# Patient Record
Sex: MALE | Race: OTHER | HISPANIC OR LATINO | URBAN - METROPOLITAN AREA
[De-identification: names, ages, dates, MRNs, and addresses within clinical notes are randomized per-mention and may not be internally consistent; named-entity substitution may affect disease eponyms.]

---

## 2017-08-30 ENCOUNTER — OUTPATIENT (OUTPATIENT)
Dept: OUTPATIENT SERVICES | Facility: HOSPITAL | Age: 52
LOS: 1 days | Discharge: HOME | End: 2017-08-30

## 2017-08-30 DIAGNOSIS — K21.9 GASTRO-ESOPHAGEAL REFLUX DISEASE WITHOUT ESOPHAGITIS: ICD-10-CM

## 2017-08-30 DIAGNOSIS — G43.909 MIGRAINE, UNSPECIFIED, NOT INTRACTABLE, WITHOUT STATUS MIGRAINOSUS: ICD-10-CM

## 2017-08-30 DIAGNOSIS — C34.90 MALIGNANT NEOPLASM OF UNSPECIFIED PART OF UNSPECIFIED BRONCHUS OR LUNG: ICD-10-CM

## 2017-08-30 DIAGNOSIS — B34.9 VIRAL INFECTION, UNSPECIFIED: ICD-10-CM

## 2017-08-30 DIAGNOSIS — C34.91 MALIGNANT NEOPLASM OF UNSPECIFIED PART OF RIGHT BRONCHUS OR LUNG: ICD-10-CM

## 2017-08-30 DIAGNOSIS — N18.2 CHRONIC KIDNEY DISEASE, STAGE 2 (MILD): ICD-10-CM

## 2017-08-30 DIAGNOSIS — B18.2 CHRONIC VIRAL HEPATITIS C: ICD-10-CM

## 2017-08-30 DIAGNOSIS — J45.20 MILD INTERMITTENT ASTHMA, UNCOMPLICATED: ICD-10-CM

## 2017-10-21 ENCOUNTER — EMERGENCY (EMERGENCY)
Facility: HOSPITAL | Age: 52
LOS: 0 days | Discharge: HOME | End: 2017-10-21

## 2017-10-21 DIAGNOSIS — Z87.891 PERSONAL HISTORY OF NICOTINE DEPENDENCE: ICD-10-CM

## 2017-10-21 DIAGNOSIS — Z23 ENCOUNTER FOR IMMUNIZATION: ICD-10-CM
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
## 2017-10-21 DIAGNOSIS — J45.909 UNSPECIFIED ASTHMA, UNCOMPLICATED: ICD-10-CM

## 2017-10-21 DIAGNOSIS — Z90.2 ACQUIRED ABSENCE OF LUNG [PART OF]: ICD-10-CM

## 2017-10-21 DIAGNOSIS — S01.511A LACERATION WITHOUT FOREIGN BODY OF LIP, INITIAL ENCOUNTER: ICD-10-CM

## 2017-10-21 DIAGNOSIS — B18.2 CHRONIC VIRAL HEPATITIS C: ICD-10-CM

## 2017-10-21 DIAGNOSIS — Z79.899 OTHER LONG TERM (CURRENT) DRUG THERAPY: ICD-10-CM

## 2017-10-21 DIAGNOSIS — S09.90XA UNSPECIFIED INJURY OF HEAD, INITIAL ENCOUNTER: ICD-10-CM

## 2017-10-21 DIAGNOSIS — S62.314A DISPLACED FRACTURE OF BASE OF FOURTH METACARPAL BONE, RIGHT HAND, INITIAL ENCOUNTER FOR CLOSED FRACTURE: ICD-10-CM

## 2017-10-21 DIAGNOSIS — C34.90 MALIGNANT NEOPLASM OF UNSPECIFIED PART OF UNSPECIFIED BRONCHUS OR LUNG: ICD-10-CM
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
## 2017-10-21 DIAGNOSIS — G43.909 MIGRAINE, UNSPECIFIED, NOT INTRACTABLE, WITHOUT STATUS MIGRAINOSUS: ICD-10-CM

## 2017-10-21 DIAGNOSIS — S00.31XA ABRASION OF NOSE, INITIAL ENCOUNTER: ICD-10-CM

## 2017-10-21 DIAGNOSIS — K21.9 GASTRO-ESOPHAGEAL REFLUX DISEASE WITHOUT ESOPHAGITIS: ICD-10-CM

## 2019-01-23 ENCOUNTER — EMERGENCY (EMERGENCY)
Facility: HOSPITAL | Age: 54
LOS: 0 days | Discharge: HOME | End: 2019-01-24
Attending: EMERGENCY MEDICINE | Admitting: EMERGENCY MEDICINE

## 2019-01-23 VITALS
HEART RATE: 116 BPM | SYSTOLIC BLOOD PRESSURE: 144 MMHG | RESPIRATION RATE: 20 BRPM | OXYGEN SATURATION: 98 % | TEMPERATURE: 100 F | DIASTOLIC BLOOD PRESSURE: 74 MMHG

## 2019-01-23 DIAGNOSIS — R10.84 GENERALIZED ABDOMINAL PAIN: ICD-10-CM

## 2019-01-23 DIAGNOSIS — F17.200 NICOTINE DEPENDENCE, UNSPECIFIED, UNCOMPLICATED: ICD-10-CM

## 2019-01-23 DIAGNOSIS — Z79.899 OTHER LONG TERM (CURRENT) DRUG THERAPY: ICD-10-CM

## 2019-01-23 DIAGNOSIS — R11.2 NAUSEA WITH VOMITING, UNSPECIFIED: ICD-10-CM

## 2019-01-23 DIAGNOSIS — J06.9 ACUTE UPPER RESPIRATORY INFECTION, UNSPECIFIED: ICD-10-CM

## 2019-01-23 DIAGNOSIS — R07.81 PLEURODYNIA: ICD-10-CM

## 2019-01-23 DIAGNOSIS — K21.9 GASTRO-ESOPHAGEAL REFLUX DISEASE WITHOUT ESOPHAGITIS: ICD-10-CM

## 2019-01-23 DIAGNOSIS — R05 COUGH: ICD-10-CM

## 2019-01-23 DIAGNOSIS — J44.1 CHRONIC OBSTRUCTIVE PULMONARY DISEASE WITH (ACUTE) EXACERBATION: ICD-10-CM

## 2019-01-23 DIAGNOSIS — R00.0 TACHYCARDIA, UNSPECIFIED: ICD-10-CM

## 2019-01-23 LAB
ALBUMIN SERPL ELPH-MCNC: 4.3 G/DL — SIGNIFICANT CHANGE UP (ref 3.5–5.2)
ALP SERPL-CCNC: 90 U/L — SIGNIFICANT CHANGE UP (ref 30–115)
ALT FLD-CCNC: 147 U/L — HIGH (ref 0–41)
AMMONIA BLD-MCNC: 40 UMOL/L — SIGNIFICANT CHANGE UP (ref 11–55)
ANION GAP SERPL CALC-SCNC: 17 MMOL/L — HIGH (ref 7–14)
AST SERPL-CCNC: 186 U/L — HIGH (ref 0–41)
BASOPHILS # BLD AUTO: 0.04 K/UL — SIGNIFICANT CHANGE UP (ref 0–0.2)
BASOPHILS NFR BLD AUTO: 0.9 % — SIGNIFICANT CHANGE UP (ref 0–1)
BILIRUB DIRECT SERPL-MCNC: 0.2 MG/DL — SIGNIFICANT CHANGE UP (ref 0–0.2)
BILIRUB INDIRECT FLD-MCNC: 0.4 MG/DL — SIGNIFICANT CHANGE UP (ref 0.2–1.2)
BILIRUB SERPL-MCNC: 0.6 MG/DL — SIGNIFICANT CHANGE UP (ref 0.2–1.2)
BLD GP AB SCN SERPL QL: SIGNIFICANT CHANGE UP
BUN SERPL-MCNC: 15 MG/DL — SIGNIFICANT CHANGE UP (ref 10–20)
CALCIUM SERPL-MCNC: 9.6 MG/DL — SIGNIFICANT CHANGE UP (ref 8.5–10.1)
CHLORIDE SERPL-SCNC: 97 MMOL/L — LOW (ref 98–110)
CO2 SERPL-SCNC: 24 MMOL/L — SIGNIFICANT CHANGE UP (ref 17–32)
CREAT SERPL-MCNC: 1.1 MG/DL — SIGNIFICANT CHANGE UP (ref 0.7–1.5)
EOSINOPHIL # BLD AUTO: 0 K/UL — SIGNIFICANT CHANGE UP (ref 0–0.7)
EOSINOPHIL NFR BLD AUTO: 0 % — SIGNIFICANT CHANGE UP (ref 0–8)
GLUCOSE SERPL-MCNC: 112 MG/DL — HIGH (ref 70–99)
HCT VFR BLD CALC: 43.6 % — SIGNIFICANT CHANGE UP (ref 42–52)
HGB BLD-MCNC: 15.4 G/DL — SIGNIFICANT CHANGE UP (ref 14–18)
LIDOCAIN IGE QN: 31 U/L — SIGNIFICANT CHANGE UP (ref 7–60)
LYMPHOCYTES # BLD AUTO: 0.16 K/UL — LOW (ref 1.2–3.4)
LYMPHOCYTES # BLD AUTO: 3.5 % — LOW (ref 20.5–51.1)
MCHC RBC-ENTMCNC: 32.7 PG — HIGH (ref 27–31)
MCHC RBC-ENTMCNC: 35.3 G/DL — SIGNIFICANT CHANGE UP (ref 32–37)
MCV RBC AUTO: 92.6 FL — SIGNIFICANT CHANGE UP (ref 80–94)
MONOCYTES # BLD AUTO: 0.36 K/UL — SIGNIFICANT CHANGE UP (ref 0.1–0.6)
MONOCYTES NFR BLD AUTO: 7.8 % — SIGNIFICANT CHANGE UP (ref 1.7–9.3)
NEUTROPHILS # BLD AUTO: 3.98 K/UL — SIGNIFICANT CHANGE UP (ref 1.4–6.5)
NEUTROPHILS NFR BLD AUTO: 81.7 % — HIGH (ref 42.2–75.2)
NRBC # BLD: 0 /100 WBCS — SIGNIFICANT CHANGE UP (ref 0–0)
PLATELET # BLD AUTO: 70 K/UL — LOW (ref 130–400)
POTASSIUM SERPL-MCNC: 4 MMOL/L — SIGNIFICANT CHANGE UP (ref 3.5–5)
POTASSIUM SERPL-SCNC: 4 MMOL/L — SIGNIFICANT CHANGE UP (ref 3.5–5)
PROT SERPL-MCNC: 8.4 G/DL — HIGH (ref 6–8)
RBC # BLD: 4.71 M/UL — SIGNIFICANT CHANGE UP (ref 4.7–6.1)
RBC # FLD: 13.2 % — SIGNIFICANT CHANGE UP (ref 11.5–14.5)
SODIUM SERPL-SCNC: 138 MMOL/L — SIGNIFICANT CHANGE UP (ref 135–146)
TYPE + AB SCN PNL BLD: SIGNIFICANT CHANGE UP
WBC # BLD: 4.62 K/UL — LOW (ref 4.8–10.8)
WBC # FLD AUTO: 4.62 K/UL — LOW (ref 4.8–10.8)

## 2019-01-23 RX ORDER — FAMOTIDINE 10 MG/ML
20 INJECTION INTRAVENOUS ONCE
Qty: 0 | Refills: 0 | Status: COMPLETED | OUTPATIENT
Start: 2019-01-23 | End: 2019-01-23

## 2019-01-23 RX ORDER — IPRATROPIUM/ALBUTEROL SULFATE 18-103MCG
3 AEROSOL WITH ADAPTER (GRAM) INHALATION ONCE
Qty: 0 | Refills: 0 | Status: COMPLETED | OUTPATIENT
Start: 2019-01-23 | End: 2019-01-23

## 2019-01-23 RX ORDER — ACETAMINOPHEN 500 MG
650 TABLET ORAL ONCE
Qty: 0 | Refills: 0 | Status: COMPLETED | OUTPATIENT
Start: 2019-01-23 | End: 2019-01-23

## 2019-01-23 RX ORDER — ONDANSETRON 8 MG/1
4 TABLET, FILM COATED ORAL ONCE
Qty: 0 | Refills: 0 | Status: COMPLETED | OUTPATIENT
Start: 2019-01-23 | End: 2019-01-23

## 2019-01-23 RX ADMIN — ONDANSETRON 4 MILLIGRAM(S): 8 TABLET, FILM COATED ORAL at 22:30

## 2019-01-23 RX ADMIN — Medication 650 MILLIGRAM(S): at 22:30

## 2019-01-23 RX ADMIN — FAMOTIDINE 20 MILLIGRAM(S): 10 INJECTION INTRAVENOUS at 22:30

## 2019-01-23 RX ADMIN — Medication 3 MILLILITER(S): at 22:30

## 2019-01-23 NOTE — ED PROVIDER NOTE - NS ED ROS FT
General: +fevers, chills, nausea, vomiting  Eyes:  No visual changes, eye pain or discharge.  ENMT:  No hearing changes,  Cardiac:  No chest pain, + SOB, no edema.  Respiratory:  +cough. no respiratory distress, +hx copd  GI:  +nausea, vomiting, abdominal pain.  :  No dysuria, frequency or burning.  MS:  +myalgia,  Neuro: No LOC.  Skin:  No skin rash.   Endocrine: No history of thyroid disease or diabetes.

## 2019-01-23 NOTE — ED ADULT TRIAGE NOTE - WEIGHT IN LBS
Indication: Right hand swelling.

 

TECHNIQUE: 3 views of the right hand

 

COMPARISON: None

 

FINDINGS:

No acute fracture or dislocation. No arthritic changes. No soft tissue 

abnormality.

 

IMPRESSION: No acute findings.

 

Electronically signed by: Antonio Merida DO (8/21/2018 10:28 PM) [facility]
173

## 2019-01-23 NOTE — ED PROVIDER NOTE - PROGRESS NOTE DETAILS
results thus far dw pt and family. pt states baseline platelet count around 75. reports known hx of low wbc but unsure of number. pt feels improved, ambulating without assistance. results, return precautions and need for follow up. Patient to be discharged from ED. Any available test results were discussed with patient and/or family. Verbal instructions given, including instructions to return to ED immediately for any new, worsening, or concerning symptoms. Patient endorsed understanding. Written discharge instructions additionally given, including follow-up plan.

## 2019-01-23 NOTE — ED PROVIDER NOTE - OBJECTIVE STATEMENT
52yo m pmhx cirrhosis 2/2 etoh, hep c, s/p lung tumor removal, active smoker, copd, gerd, chronic low platelet count, mass/spot recently found on kidney, lung, liver pending further work up presents 1-2 days  of productive cough and vomiting. pt states that after coughing fits, the sputum will occasionally be blood streaked, pt says this happens to him 2/2 his cirrhosis. pt is intermittently tolerating food. also reports myalgias, sob and chest tightness similar to his copd. pt also reports right sided rib pain with coughing fits. +sick contacts.

## 2019-01-23 NOTE — ED ADULT NURSE NOTE - PMH
Cirrhosis    COPD (chronic obstructive pulmonary disease)    GERD (gastroesophageal reflux disease)    Hepatitis C    Kidney mass    Liver mass    Lung mass

## 2019-01-23 NOTE — ED PROVIDER NOTE - PHYSICAL EXAMINATION
CONSTITUTIONAL: Well-developed; well-nourished; in no acute distress, speaking in full sentences  SKIN: warm, dry  HEAD: Normocephalic; atraumatic  EYES: PERRL, EOMI, no conjunctival erythema  ENT: No nasal discharge; airway clear, mucous membranes moist  NECK: Supple; non tender, FROM  CARD: +S1, S2 no murmurs, gallops, or rubs. tachycardic. radial 2+  RESP: No wheezes, rales or rhonchi. CTABL, no increased wob  ABD: soft nd, diffuse mild tenderness, no rebound, no guarding, no rigidity, +murphys  EXT: moves all extremities, no clubbing, cyanosis or edema. no calf tenderness, les equal in color, temp, size  NEURO: Alert, oriented, grossly unremarkable, no focal deficits, cn ii-xii grossly intact  PSYCH: Cooperative, appropriate

## 2019-01-23 NOTE — ED PROVIDER NOTE - MEDICAL DECISION MAKING DETAILS
Pt reassessed, reported feeling much better and wanted to go home. Labs and imaging reviewed with pt and family at bedside.  Advised close follow up with PMD for reevaluation and pt agreed.  Return precautions advised and pt verbalized understanding.

## 2019-01-23 NOTE — ED ADULT NURSE NOTE - NSIMPLEMENTINTERV_GEN_ALL_ED
Implemented All Universal Safety Interventions:  [place] to call system. Call bell, personal items and telephone within reach. Instruct patient to call for assistance. Room bathroom lighting operational. Non-slip footwear when patient is off stretcher. Physically safe environment: no spills, clutter or unnecessary equipment. Stretcher in lowest position, wheels locked, appropriate side rails in place.

## 2019-01-23 NOTE — ED PROVIDER NOTE - ATTENDING CONTRIBUTION TO CARE
52 yo male with PMH cirrhosis, Hep C, COPD, GERD, hx lung tumor removal with current workup for mass liver (undifferentiated per pt) presented c/o productive cough with whitish sputum and nausea. + SOB at times, + chest tightness with coughing, denied CP at rest or with exertion. + several episodes hemoptysis.  + myalgias. Denied any focal weakness, persistent HA, paresthesias. VS noted, agree with exam as above.  A/P: Cough; Hemoptysis -labs, IVF, CT chest/A/P, given hx new undifferentiated mass in lung/liver, reassess 52 yo male with PMH cirrhosis, Hep C, COPD, GERD, hx lung tumor removal with current workup for mass liver (undifferentiated per pt) presented c/o productive cough with whitish sputum and nausea. + SOB at times, + chest tightness with coughing, denied CP at rest or with exertion. + several episodes hemoptysis.  + myalgias. Denied any focal weakness, persistent HA, paresthesias. VS noted, agree with exam as above.  A/P: Cough; Hemoptysis -EKG, labs, IVF, CT chest/A/P, US, given hx new undifferentiated mass in lung/liver, reassess

## 2019-01-23 NOTE — ED PROVIDER NOTE - NSFOLLOWUPINSTRUCTIONS_ED_ALL_ED_FT
Follow up with your primary care doctor in 1-3 days     Respiratory Infection    A respiratory infection is an illness that affects parts of the body used for breathing, like the lungs, nose, and throat. It is caused by a germ. Symptoms can include runny nose, coughing, sneezing, fatigue, body aches, sore throat, fever, or headache. Take the antibiotics as directed.     SEEK IMMEDIATE MEDICAL CARE IF YOU HAVE ANY OF THE FOLLOWING SYMPTOMS: shortness of breath, chest pain, fever over 10 days, or lightheadedness/dizziness.     Chronic Obstructive Pulmonary Disease    Chronic obstructive pulmonary disease (COPD) is a lung condition in which airflow from the lungs is limited. Causes include smoking, secondhand smoke exposure, genetics, or recurrent infections. Take all medicines (inhaled or pills) as directed by your health care provider. Avoid exposure to irritants such as smoke, chemicals, and fumes that aggravate your breathing.    If you are a smoker, the most important thing that you can do is stop smoking. Continuing to smoke will cause further lung damage and breathing trouble. Ask your health care provider for help with quitting smoking.    SEEK IMMEDIATE MEDICAL CARE IF YOU HAVE ANY OF THE FOLLOWING SYMPTOMS: shortness of breath at rest or when talking, bluish discoloration of lips, skin, fever, worsening cough, unexplained chest pain, or lightheadedness/dizziness.

## 2019-01-24 VITALS
SYSTOLIC BLOOD PRESSURE: 144 MMHG | DIASTOLIC BLOOD PRESSURE: 77 MMHG | OXYGEN SATURATION: 97 % | RESPIRATION RATE: 18 BRPM | TEMPERATURE: 99 F | HEART RATE: 86 BPM

## 2019-01-24 LAB
APPEARANCE UR: CLEAR — SIGNIFICANT CHANGE UP
BILIRUB UR-MCNC: NEGATIVE — SIGNIFICANT CHANGE UP
COLOR SPEC: YELLOW — SIGNIFICANT CHANGE UP
DIFF PNL FLD: NEGATIVE — SIGNIFICANT CHANGE UP
EPI CELLS # UR: ABNORMAL /HPF
GLUCOSE UR QL: NEGATIVE — SIGNIFICANT CHANGE UP
KETONES UR-MCNC: NEGATIVE — SIGNIFICANT CHANGE UP
LEUKOCYTE ESTERASE UR-ACNC: NEGATIVE — SIGNIFICANT CHANGE UP
NITRITE UR-MCNC: NEGATIVE — SIGNIFICANT CHANGE UP
PH UR: 6.5 — SIGNIFICANT CHANGE UP (ref 5–8)
PROT UR-MCNC: ABNORMAL
SP GR SPEC: >=1.03 — SIGNIFICANT CHANGE UP (ref 1.01–1.03)
UROBILINOGEN FLD QL: 1 (ref 0.2–0.2)

## 2019-01-24 RX ORDER — AZITHROMYCIN 500 MG/1
500 TABLET, FILM COATED ORAL ONCE
Qty: 0 | Refills: 0 | Status: COMPLETED | OUTPATIENT
Start: 2019-01-24 | End: 2019-01-24

## 2019-01-24 RX ORDER — SODIUM CHLORIDE 9 MG/ML
1000 INJECTION, SOLUTION INTRAVENOUS ONCE
Qty: 0 | Refills: 0 | Status: COMPLETED | OUTPATIENT
Start: 2019-01-24 | End: 2019-01-24

## 2019-01-24 RX ORDER — AZITHROMYCIN 500 MG/1
1 TABLET, FILM COATED ORAL
Qty: 4 | Refills: 0
Start: 2019-01-24 | End: 2019-01-27

## 2019-01-24 RX ADMIN — AZITHROMYCIN 500 MILLIGRAM(S): 500 TABLET, FILM COATED ORAL at 03:46

## 2019-01-24 RX ADMIN — SODIUM CHLORIDE 1000 MILLILITER(S): 9 INJECTION, SOLUTION INTRAVENOUS at 00:30

## 2019-01-24 RX ADMIN — SODIUM CHLORIDE 1000 MILLILITER(S): 9 INJECTION, SOLUTION INTRAVENOUS at 01:30

## 2020-06-08 PROBLEM — Z00.00 ENCOUNTER FOR PREVENTIVE HEALTH EXAMINATION: Status: ACTIVE | Noted: 2020-06-08

## 2022-02-27 ENCOUNTER — INPATIENT (INPATIENT)
Facility: HOSPITAL | Age: 57
LOS: 2 days | Discharge: HOME | End: 2022-03-02
Attending: STUDENT IN AN ORGANIZED HEALTH CARE EDUCATION/TRAINING PROGRAM | Admitting: STUDENT IN AN ORGANIZED HEALTH CARE EDUCATION/TRAINING PROGRAM
Payer: MEDICARE

## 2022-02-27 VITALS
OXYGEN SATURATION: 100 % | SYSTOLIC BLOOD PRESSURE: 134 MMHG | HEIGHT: 64 IN | RESPIRATION RATE: 20 BRPM | WEIGHT: 139.99 LBS | TEMPERATURE: 97 F | HEART RATE: 126 BPM | DIASTOLIC BLOOD PRESSURE: 96 MMHG

## 2022-02-27 PROBLEM — N28.89 OTHER SPECIFIED DISORDERS OF KIDNEY AND URETER: Chronic | Status: ACTIVE | Noted: 2019-01-23

## 2022-02-27 PROBLEM — R91.8 OTHER NONSPECIFIC ABNORMAL FINDING OF LUNG FIELD: Chronic | Status: ACTIVE | Noted: 2019-01-23

## 2022-02-27 PROBLEM — K74.60 UNSPECIFIED CIRRHOSIS OF LIVER: Chronic | Status: ACTIVE | Noted: 2019-01-23

## 2022-02-27 PROBLEM — K21.9 GASTRO-ESOPHAGEAL REFLUX DISEASE WITHOUT ESOPHAGITIS: Chronic | Status: ACTIVE | Noted: 2019-01-23

## 2022-02-27 PROBLEM — R16.0 HEPATOMEGALY, NOT ELSEWHERE CLASSIFIED: Chronic | Status: ACTIVE | Noted: 2019-01-23

## 2022-02-27 PROBLEM — B19.20 UNSPECIFIED VIRAL HEPATITIS C WITHOUT HEPATIC COMA: Chronic | Status: ACTIVE | Noted: 2019-01-23

## 2022-02-27 PROBLEM — J44.9 CHRONIC OBSTRUCTIVE PULMONARY DISEASE, UNSPECIFIED: Chronic | Status: ACTIVE | Noted: 2019-01-23

## 2022-02-27 LAB
ALBUMIN SERPL ELPH-MCNC: 4.3 G/DL — SIGNIFICANT CHANGE UP (ref 3.5–5.2)
ALBUMIN SERPL ELPH-MCNC: 5.2 G/DL — SIGNIFICANT CHANGE UP (ref 3.5–5.2)
ALP SERPL-CCNC: 61 U/L — SIGNIFICANT CHANGE UP (ref 30–115)
ALP SERPL-CCNC: 75 U/L — SIGNIFICANT CHANGE UP (ref 30–115)
ALT FLD-CCNC: 14 U/L — SIGNIFICANT CHANGE UP (ref 0–41)
ALT FLD-CCNC: 15 U/L — SIGNIFICANT CHANGE UP (ref 0–41)
ANION GAP SERPL CALC-SCNC: 15 MMOL/L — HIGH (ref 7–14)
ANION GAP SERPL CALC-SCNC: 26 MMOL/L — HIGH (ref 7–14)
ANION GAP SERPL CALC-SCNC: 28 MMOL/L — HIGH (ref 7–14)
APAP SERPL-MCNC: <5 UG/ML — LOW (ref 10–30)
APPEARANCE UR: CLEAR — SIGNIFICANT CHANGE UP
APTT BLD: 36.5 SEC — SIGNIFICANT CHANGE UP (ref 27–39.2)
AST SERPL-CCNC: 22 U/L — SIGNIFICANT CHANGE UP (ref 0–41)
AST SERPL-CCNC: 24 U/L — SIGNIFICANT CHANGE UP (ref 0–41)
B-OH-BUTYR SERPL-SCNC: 1.7 MMOL/L — HIGH
B-OH-BUTYR SERPL-SCNC: <0.2 MMOL/L — SIGNIFICANT CHANGE UP
BACTERIA # UR AUTO: NEGATIVE — SIGNIFICANT CHANGE UP
BASE EXCESS BLDV CALC-SCNC: -10.6 MMOL/L — LOW (ref -2–3)
BASE EXCESS BLDV CALC-SCNC: -8.7 MMOL/L — LOW (ref -2–3)
BASE EXCESS BLDV CALC-SCNC: -9.7 MMOL/L — LOW (ref -2–3)
BASOPHILS # BLD AUTO: 0.11 K/UL — SIGNIFICANT CHANGE UP (ref 0–0.2)
BASOPHILS NFR BLD AUTO: 0.7 % — SIGNIFICANT CHANGE UP (ref 0–1)
BILIRUB DIRECT SERPL-MCNC: <0.2 MG/DL — SIGNIFICANT CHANGE UP (ref 0–0.3)
BILIRUB INDIRECT FLD-MCNC: >0.1 MG/DL — LOW (ref 0.2–1.2)
BILIRUB SERPL-MCNC: 0.2 MG/DL — SIGNIFICANT CHANGE UP (ref 0.2–1.2)
BILIRUB SERPL-MCNC: 0.3 MG/DL — SIGNIFICANT CHANGE UP (ref 0.2–1.2)
BILIRUB UR-MCNC: NEGATIVE — SIGNIFICANT CHANGE UP
BUN SERPL-MCNC: 21 MG/DL — HIGH (ref 10–20)
BUN SERPL-MCNC: 26 MG/DL — HIGH (ref 10–20)
BUN SERPL-MCNC: 27 MG/DL — HIGH (ref 10–20)
CA-I SERPL-SCNC: 1.06 MMOL/L — LOW (ref 1.15–1.33)
CA-I SERPL-SCNC: 1.08 MMOL/L — LOW (ref 1.15–1.33)
CA-I SERPL-SCNC: 1.11 MMOL/L — LOW (ref 1.15–1.33)
CALCIUM SERPL-MCNC: 8.5 MG/DL — SIGNIFICANT CHANGE UP (ref 8.5–10.1)
CALCIUM SERPL-MCNC: 8.6 MG/DL — SIGNIFICANT CHANGE UP (ref 8.5–10.1)
CALCIUM SERPL-MCNC: 9.6 MG/DL — SIGNIFICANT CHANGE UP (ref 8.5–10.1)
CHLORIDE SERPL-SCNC: 103 MMOL/L — SIGNIFICANT CHANGE UP (ref 98–110)
CHLORIDE SERPL-SCNC: 103 MMOL/L — SIGNIFICANT CHANGE UP (ref 98–110)
CHLORIDE SERPL-SCNC: 98 MMOL/L — SIGNIFICANT CHANGE UP (ref 98–110)
CO2 SERPL-SCNC: 15 MMOL/L — LOW (ref 17–32)
CO2 SERPL-SCNC: 16 MMOL/L — LOW (ref 17–32)
CO2 SERPL-SCNC: 21 MMOL/L — SIGNIFICANT CHANGE UP (ref 17–32)
COLOR SPEC: SIGNIFICANT CHANGE UP
CREAT SERPL-MCNC: 1.1 MG/DL — SIGNIFICANT CHANGE UP (ref 0.7–1.5)
CREAT SERPL-MCNC: 1.2 MG/DL — SIGNIFICANT CHANGE UP (ref 0.7–1.5)
CREAT SERPL-MCNC: 1.5 MG/DL — SIGNIFICANT CHANGE UP (ref 0.7–1.5)
D DIMER BLD IA.RAPID-MCNC: 166 NG/ML DDU — SIGNIFICANT CHANGE UP (ref 0–230)
DIFF PNL FLD: NEGATIVE — SIGNIFICANT CHANGE UP
EOSINOPHIL # BLD AUTO: 0.05 K/UL — SIGNIFICANT CHANGE UP (ref 0–0.7)
EOSINOPHIL NFR BLD AUTO: 0.3 % — SIGNIFICANT CHANGE UP (ref 0–8)
EPI CELLS # UR: 3 /HPF — SIGNIFICANT CHANGE UP (ref 0–5)
ETHANOL SERPL-MCNC: 110 MG/DL — HIGH
GAS PNL BLDV: 136 MMOL/L — SIGNIFICANT CHANGE UP (ref 136–145)
GAS PNL BLDV: 136 MMOL/L — SIGNIFICANT CHANGE UP (ref 136–145)
GAS PNL BLDV: 144 MMOL/L — SIGNIFICANT CHANGE UP (ref 136–145)
GAS PNL BLDV: SIGNIFICANT CHANGE UP
GLUCOSE SERPL-MCNC: 199 MG/DL — HIGH (ref 70–99)
GLUCOSE SERPL-MCNC: 206 MG/DL — HIGH (ref 70–99)
GLUCOSE SERPL-MCNC: 72 MG/DL — SIGNIFICANT CHANGE UP (ref 70–99)
GLUCOSE UR QL: NEGATIVE — SIGNIFICANT CHANGE UP
HCO3 BLDV-SCNC: 17 MMOL/L — LOW (ref 22–29)
HCO3 BLDV-SCNC: 18 MMOL/L — LOW (ref 22–29)
HCO3 BLDV-SCNC: 19 MMOL/L — LOW (ref 22–29)
HCT VFR BLD CALC: 50.1 % — SIGNIFICANT CHANGE UP (ref 42–52)
HCT VFR BLDA CALC: 44 % — SIGNIFICANT CHANGE UP (ref 39–51)
HCT VFR BLDA CALC: 45 % — SIGNIFICANT CHANGE UP (ref 39–51)
HCT VFR BLDA CALC: 49 % — SIGNIFICANT CHANGE UP (ref 39–51)
HGB BLD CALC-MCNC: 14.6 G/DL — SIGNIFICANT CHANGE UP (ref 12.6–17.4)
HGB BLD CALC-MCNC: 14.9 G/DL — SIGNIFICANT CHANGE UP (ref 12.6–17.4)
HGB BLD CALC-MCNC: 16.2 G/DL — SIGNIFICANT CHANGE UP (ref 12.6–17.4)
HGB BLD-MCNC: 16.1 G/DL — SIGNIFICANT CHANGE UP (ref 14–18)
HYALINE CASTS # UR AUTO: 11 /LPF — HIGH (ref 0–7)
IMM GRANULOCYTES NFR BLD AUTO: 0.7 % — HIGH (ref 0.1–0.3)
INR BLD: 1.16 RATIO — SIGNIFICANT CHANGE UP (ref 0.65–1.3)
KETONES UR-MCNC: ABNORMAL
LACTATE BLDV-MCNC: 7.4 MMOL/L — CRITICAL HIGH (ref 0.5–2)
LACTATE BLDV-MCNC: 8.5 MMOL/L — CRITICAL HIGH (ref 0.5–2)
LACTATE BLDV-MCNC: 8.7 MMOL/L — CRITICAL HIGH (ref 0.5–2)
LACTATE SERPL-SCNC: 2.8 MMOL/L — HIGH (ref 0.7–2)
LEUKOCYTE ESTERASE UR-ACNC: NEGATIVE — SIGNIFICANT CHANGE UP
LIDOCAIN IGE QN: 29 U/L — SIGNIFICANT CHANGE UP (ref 7–60)
LYMPHOCYTES # BLD AUTO: 12.2 % — LOW (ref 20.5–51.1)
LYMPHOCYTES # BLD AUTO: 2.05 K/UL — SIGNIFICANT CHANGE UP (ref 1.2–3.4)
MAGNESIUM SERPL-MCNC: 2 MG/DL — SIGNIFICANT CHANGE UP (ref 1.8–2.4)
MCHC RBC-ENTMCNC: 30.1 PG — SIGNIFICANT CHANGE UP (ref 27–31)
MCHC RBC-ENTMCNC: 32.1 G/DL — SIGNIFICANT CHANGE UP (ref 32–37)
MCV RBC AUTO: 93.8 FL — SIGNIFICANT CHANGE UP (ref 80–94)
MONOCYTES # BLD AUTO: 1.31 K/UL — HIGH (ref 0.1–0.6)
MONOCYTES NFR BLD AUTO: 7.8 % — SIGNIFICANT CHANGE UP (ref 1.7–9.3)
NEUTROPHILS # BLD AUTO: 13.15 K/UL — HIGH (ref 1.4–6.5)
NEUTROPHILS NFR BLD AUTO: 78.3 % — HIGH (ref 42.2–75.2)
NITRITE UR-MCNC: NEGATIVE — SIGNIFICANT CHANGE UP
NRBC # BLD: 0 /100 WBCS — SIGNIFICANT CHANGE UP (ref 0–0)
NT-PROBNP SERPL-SCNC: 103 PG/ML — SIGNIFICANT CHANGE UP (ref 0–300)
OSMOLALITY SERPL: 294 MOS/KG — SIGNIFICANT CHANGE UP (ref 275–300)
PCO2 BLDV: 40 MMHG — LOW (ref 42–55)
PCO2 BLDV: 44 MMHG — SIGNIFICANT CHANGE UP (ref 42–55)
PCO2 BLDV: 49 MMHG — SIGNIFICANT CHANGE UP (ref 42–55)
PH BLDV: 7.19 — LOW (ref 7.32–7.43)
PH BLDV: 7.2 — LOW (ref 7.32–7.43)
PH BLDV: 7.26 — LOW (ref 7.32–7.43)
PH UR: 5.5 — SIGNIFICANT CHANGE UP (ref 5–8)
PHOSPHATE SERPL-MCNC: 1.4 MG/DL — LOW (ref 2.1–4.9)
PLATELET # BLD AUTO: 237 K/UL — SIGNIFICANT CHANGE UP (ref 130–400)
PO2 BLDV: 31 MMHG — SIGNIFICANT CHANGE UP
PO2 BLDV: 38 MMHG — SIGNIFICANT CHANGE UP
PO2 BLDV: 46 MMHG — SIGNIFICANT CHANGE UP
POTASSIUM BLDV-SCNC: 4.4 MMOL/L — SIGNIFICANT CHANGE UP (ref 3.5–5.1)
POTASSIUM BLDV-SCNC: 4.4 MMOL/L — SIGNIFICANT CHANGE UP (ref 3.5–5.1)
POTASSIUM BLDV-SCNC: 6.3 MMOL/L — CRITICAL HIGH (ref 3.5–5.1)
POTASSIUM SERPL-MCNC: 4.3 MMOL/L — SIGNIFICANT CHANGE UP (ref 3.5–5)
POTASSIUM SERPL-MCNC: 4.4 MMOL/L — SIGNIFICANT CHANGE UP (ref 3.5–5)
POTASSIUM SERPL-MCNC: 4.9 MMOL/L — SIGNIFICANT CHANGE UP (ref 3.5–5)
POTASSIUM SERPL-SCNC: 4.3 MMOL/L — SIGNIFICANT CHANGE UP (ref 3.5–5)
POTASSIUM SERPL-SCNC: 4.4 MMOL/L — SIGNIFICANT CHANGE UP (ref 3.5–5)
POTASSIUM SERPL-SCNC: 4.9 MMOL/L — SIGNIFICANT CHANGE UP (ref 3.5–5)
PROT SERPL-MCNC: 7 G/DL — SIGNIFICANT CHANGE UP (ref 6–8)
PROT SERPL-MCNC: 8.4 G/DL — HIGH (ref 6–8)
PROT UR-MCNC: ABNORMAL
PROTHROM AB SERPL-ACNC: 13.3 SEC — HIGH (ref 9.95–12.87)
RBC # BLD: 5.34 M/UL — SIGNIFICANT CHANGE UP (ref 4.7–6.1)
RBC # FLD: 13.9 % — SIGNIFICANT CHANGE UP (ref 11.5–14.5)
RBC CASTS # UR COMP ASSIST: 1 /HPF — SIGNIFICANT CHANGE UP (ref 0–4)
SAO2 % BLDV: 39.9 % — SIGNIFICANT CHANGE UP
SAO2 % BLDV: 62.8 % — SIGNIFICANT CHANGE UP
SAO2 % BLDV: 72.9 % — SIGNIFICANT CHANGE UP
SARS-COV-2 RNA SPEC QL NAA+PROBE: SIGNIFICANT CHANGE UP
SODIUM SERPL-SCNC: 139 MMOL/L — SIGNIFICANT CHANGE UP (ref 135–146)
SODIUM SERPL-SCNC: 139 MMOL/L — SIGNIFICANT CHANGE UP (ref 135–146)
SODIUM SERPL-SCNC: 147 MMOL/L — HIGH (ref 135–146)
SP GR SPEC: 1.03 — HIGH (ref 1.01–1.03)
TROPONIN T SERPL-MCNC: <0.01 NG/ML — SIGNIFICANT CHANGE UP
UROBILINOGEN FLD QL: SIGNIFICANT CHANGE UP
WBC # BLD: 16.78 K/UL — HIGH (ref 4.8–10.8)
WBC # FLD AUTO: 16.78 K/UL — HIGH (ref 4.8–10.8)
WBC UR QL: 5 /HPF — SIGNIFICANT CHANGE UP (ref 0–5)

## 2022-02-27 PROCEDURE — 99408 AUDIT/DAST 15-30 MIN: CPT

## 2022-02-27 PROCEDURE — 74177 CT ABD & PELVIS W/CONTRAST: CPT | Mod: 26,MG

## 2022-02-27 PROCEDURE — 99291 CRITICAL CARE FIRST HOUR: CPT

## 2022-02-27 PROCEDURE — 93010 ELECTROCARDIOGRAM REPORT: CPT | Mod: 76

## 2022-02-27 PROCEDURE — 71045 X-RAY EXAM CHEST 1 VIEW: CPT | Mod: 26

## 2022-02-27 PROCEDURE — G1004: CPT

## 2022-02-27 PROCEDURE — 99053 MED SERV 10PM-8AM 24 HR FAC: CPT

## 2022-02-27 PROCEDURE — 99223 1ST HOSP IP/OBS HIGH 75: CPT | Mod: 25

## 2022-02-27 RX ORDER — MORPHINE SULFATE 50 MG/1
2 CAPSULE, EXTENDED RELEASE ORAL ONCE
Refills: 0 | Status: DISCONTINUED | OUTPATIENT
Start: 2022-02-27 | End: 2022-02-27

## 2022-02-27 RX ORDER — AMITRIPTYLINE HCL 25 MG
10 TABLET ORAL
Refills: 0 | Status: DISCONTINUED | OUTPATIENT
Start: 2022-02-27 | End: 2022-02-27

## 2022-02-27 RX ORDER — FOLIC ACID 0.8 MG
1 TABLET ORAL ONCE
Refills: 0 | Status: COMPLETED | OUTPATIENT
Start: 2022-02-27 | End: 2022-02-27

## 2022-02-27 RX ORDER — GABAPENTIN 400 MG/1
100 CAPSULE ORAL THREE TIMES A DAY
Refills: 0 | Status: DISCONTINUED | OUTPATIENT
Start: 2022-02-27 | End: 2022-03-02

## 2022-02-27 RX ORDER — ALBUTEROL 90 UG/1
2 AEROSOL, METERED ORAL
Qty: 0 | Refills: 0 | DISCHARGE

## 2022-02-27 RX ORDER — PANTOPRAZOLE SODIUM 20 MG/1
40 TABLET, DELAYED RELEASE ORAL DAILY
Refills: 0 | Status: DISCONTINUED | OUTPATIENT
Start: 2022-02-27 | End: 2022-03-02

## 2022-02-27 RX ORDER — ALBUTEROL 90 UG/1
2 AEROSOL, METERED ORAL EVERY 6 HOURS
Refills: 0 | Status: DISCONTINUED | OUTPATIENT
Start: 2022-02-27 | End: 2022-03-02

## 2022-02-27 RX ORDER — ONDANSETRON 8 MG/1
4 TABLET, FILM COATED ORAL ONCE
Refills: 0 | Status: COMPLETED | OUTPATIENT
Start: 2022-02-27 | End: 2022-02-27

## 2022-02-27 RX ORDER — DEXAMETHASONE 0.5 MG/5ML
10 ELIXIR ORAL ONCE
Refills: 0 | Status: COMPLETED | OUTPATIENT
Start: 2022-02-27 | End: 2022-02-27

## 2022-02-27 RX ORDER — BUDESONIDE AND FORMOTEROL FUMARATE DIHYDRATE 160; 4.5 UG/1; UG/1
2 AEROSOL RESPIRATORY (INHALATION)
Qty: 0 | Refills: 0 | DISCHARGE

## 2022-02-27 RX ORDER — THIAMINE MONONITRATE (VIT B1) 100 MG
100 TABLET ORAL ONCE
Refills: 0 | Status: COMPLETED | OUTPATIENT
Start: 2022-02-27 | End: 2022-02-27

## 2022-02-27 RX ORDER — THIAMINE MONONITRATE (VIT B1) 100 MG
100 TABLET ORAL DAILY
Refills: 0 | Status: DISCONTINUED | OUTPATIENT
Start: 2022-02-27 | End: 2022-03-02

## 2022-02-27 RX ORDER — MAGNESIUM SULFATE 500 MG/ML
1 VIAL (ML) INJECTION ONCE
Refills: 0 | Status: COMPLETED | OUTPATIENT
Start: 2022-02-27 | End: 2022-02-27

## 2022-02-27 RX ORDER — TIOTROPIUM BROMIDE 18 UG/1
1 CAPSULE ORAL; RESPIRATORY (INHALATION)
Qty: 0 | Refills: 0 | DISCHARGE

## 2022-02-27 RX ORDER — SODIUM CHLORIDE 9 MG/ML
1000 INJECTION, SOLUTION INTRAVENOUS
Refills: 0 | Status: DISCONTINUED | OUTPATIENT
Start: 2022-02-27 | End: 2022-03-01

## 2022-02-27 RX ORDER — MAGNESIUM SULFATE 500 MG/ML
2 VIAL (ML) INJECTION ONCE
Refills: 0 | Status: COMPLETED | OUTPATIENT
Start: 2022-02-27 | End: 2022-02-27

## 2022-02-27 RX ORDER — IPRATROPIUM/ALBUTEROL SULFATE 18-103MCG
3 AEROSOL WITH ADAPTER (GRAM) INHALATION ONCE
Refills: 0 | Status: COMPLETED | OUTPATIENT
Start: 2022-02-27 | End: 2022-02-27

## 2022-02-27 RX ORDER — ONDANSETRON 8 MG/1
4 TABLET, FILM COATED ORAL EVERY 8 HOURS
Refills: 0 | Status: DISCONTINUED | OUTPATIENT
Start: 2022-02-27 | End: 2022-03-02

## 2022-02-27 RX ORDER — BUDESONIDE AND FORMOTEROL FUMARATE DIHYDRATE 160; 4.5 UG/1; UG/1
2 AEROSOL RESPIRATORY (INHALATION)
Refills: 0 | Status: DISCONTINUED | OUTPATIENT
Start: 2022-02-27 | End: 2022-03-02

## 2022-02-27 RX ORDER — FOLIC ACID 0.8 MG
1 TABLET ORAL DAILY
Refills: 0 | Status: DISCONTINUED | OUTPATIENT
Start: 2022-02-27 | End: 2022-03-02

## 2022-02-27 RX ORDER — CEFEPIME 1 G/1
2000 INJECTION, POWDER, FOR SOLUTION INTRAMUSCULAR; INTRAVENOUS ONCE
Refills: 0 | Status: COMPLETED | OUTPATIENT
Start: 2022-02-27 | End: 2022-02-27

## 2022-02-27 RX ORDER — GABAPENTIN 400 MG/1
1 CAPSULE ORAL
Qty: 0 | Refills: 0 | DISCHARGE

## 2022-02-27 RX ORDER — VANCOMYCIN HCL 1 G
1000 VIAL (EA) INTRAVENOUS ONCE
Refills: 0 | Status: COMPLETED | OUTPATIENT
Start: 2022-02-27 | End: 2022-02-27

## 2022-02-27 RX ORDER — CYCLOSPORINE 0.5 MG/ML
1 EMULSION OPHTHALMIC
Qty: 0 | Refills: 0 | DISCHARGE

## 2022-02-27 RX ORDER — IPRATROPIUM/ALBUTEROL SULFATE 18-103MCG
3 AEROSOL WITH ADAPTER (GRAM) INHALATION
Refills: 0 | Status: COMPLETED | OUTPATIENT
Start: 2022-02-27 | End: 2022-02-27

## 2022-02-27 RX ORDER — PANTOPRAZOLE SODIUM 20 MG/1
0 TABLET, DELAYED RELEASE ORAL
Qty: 0 | Refills: 0 | DISCHARGE

## 2022-02-27 RX ORDER — SODIUM CHLORIDE 9 MG/ML
1950 INJECTION, SOLUTION INTRAVENOUS ONCE
Refills: 0 | Status: COMPLETED | OUTPATIENT
Start: 2022-02-27 | End: 2022-02-27

## 2022-02-27 RX ORDER — TIOTROPIUM BROMIDE 18 UG/1
1 CAPSULE ORAL; RESPIRATORY (INHALATION) DAILY
Refills: 0 | Status: DISCONTINUED | OUTPATIENT
Start: 2022-02-27 | End: 2022-03-02

## 2022-02-27 RX ORDER — AMITRIPTYLINE HCL 25 MG
1 TABLET ORAL
Qty: 0 | Refills: 0 | DISCHARGE

## 2022-02-27 RX ADMIN — Medication 1 MILLIGRAM(S): at 04:00

## 2022-02-27 RX ADMIN — CEFEPIME 100 MILLIGRAM(S): 1 INJECTION, POWDER, FOR SOLUTION INTRAMUSCULAR; INTRAVENOUS at 03:59

## 2022-02-27 RX ADMIN — Medication 3 MILLILITER(S): at 03:05

## 2022-02-27 RX ADMIN — SODIUM CHLORIDE 1950 MILLILITER(S): 9 INJECTION, SOLUTION INTRAVENOUS at 03:34

## 2022-02-27 RX ADMIN — Medication 1 MILLIGRAM(S): at 13:36

## 2022-02-27 RX ADMIN — Medication 100 GRAM(S): at 13:36

## 2022-02-27 RX ADMIN — Medication 4 MILLIGRAM(S): at 18:09

## 2022-02-27 RX ADMIN — SODIUM CHLORIDE 100 MILLILITER(S): 9 INJECTION, SOLUTION INTRAVENOUS at 05:00

## 2022-02-27 RX ADMIN — MORPHINE SULFATE 2 MILLIGRAM(S): 50 CAPSULE, EXTENDED RELEASE ORAL at 08:54

## 2022-02-27 RX ADMIN — GABAPENTIN 100 MILLIGRAM(S): 400 CAPSULE ORAL at 22:17

## 2022-02-27 RX ADMIN — ONDANSETRON 4 MILLIGRAM(S): 8 TABLET, FILM COATED ORAL at 03:05

## 2022-02-27 RX ADMIN — Medication 250 MILLIGRAM(S): at 05:00

## 2022-02-27 RX ADMIN — Medication 4 MILLIGRAM(S): at 10:58

## 2022-02-27 RX ADMIN — Medication 3 MILLILITER(S): at 02:45

## 2022-02-27 RX ADMIN — PANTOPRAZOLE SODIUM 40 MILLIGRAM(S): 20 TABLET, DELAYED RELEASE ORAL at 13:36

## 2022-02-27 RX ADMIN — Medication 3 MILLILITER(S): at 05:17

## 2022-02-27 RX ADMIN — Medication 25 GRAM(S): at 03:46

## 2022-02-27 RX ADMIN — GABAPENTIN 100 MILLIGRAM(S): 400 CAPSULE ORAL at 13:36

## 2022-02-27 RX ADMIN — Medication 100 MILLIGRAM(S): at 13:36

## 2022-02-27 RX ADMIN — Medication 1 TABLET(S): at 13:36

## 2022-02-27 RX ADMIN — Medication 102 MILLIGRAM(S): at 03:05

## 2022-02-27 RX ADMIN — Medication 100 MILLIGRAM(S): at 03:59

## 2022-02-27 RX ADMIN — Medication 4 MILLIGRAM(S): at 13:36

## 2022-02-27 RX ADMIN — BUDESONIDE AND FORMOTEROL FUMARATE DIHYDRATE 2 PUFF(S): 160; 4.5 AEROSOL RESPIRATORY (INHALATION) at 21:22

## 2022-02-27 RX ADMIN — Medication 4 MILLIGRAM(S): at 22:25
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
## 2022-02-27 NOTE — H&P ADULT - ASSESSMENT
57 yo male kth copd, cirrhosis, alcohol abuse, treated hep c, hx of stage 1 lung ca sp resection in 2005,  presented for N/V and abdominal pain     #N/V abdominal pain   could be gastritis  r/o pancreatitis, f/u lipase  Shi negative   no lyte disturbances   zofran prn   fluids     #lactic acidosis  no signs of infection   patient is not hypoxic   unlikely to be mesenteric ischemia   beta hydro is low, so not alcoholic ketoacidosis  r/o ingestion of other related alcohol drugs  f/u serum osmolality and alcohol level, if high osmolality after adjusting to alcohol level, then ingestion of other alcohol related substances   fluids   f/u lactic acid at 11     #copd   not in exacerbation     #cirrhosis  the patient pltl count is normal   no splenomegaly   normal INR   normal ast alt, normal bilirubin   bonacini score is low, but ct scan showed nodularity   no clinical or lab evidence of cirrhosis, just on ct scan   ct scan has a specificity and sensetivity og 50-7% to dx cirrhosis, US is more specific  previous US showed no nodularity  will repeat an US     #alcohol abuse  consult CATCH team   treat withdrawals with lorazepam taper   not in dt        55 yo male kth copd, cirrhosis, alcohol abuse, treated hep c, hx of stage 1 lung ca sp resection in 2005,  presented for N/V and abdominal pain     #N/V abdominal pain   could be gastritis  r/o pancreatitis, f/u lipase  Shi negative   no lyte disturbances   zofran prn   fluids     #lactic acidosis  no signs of infection   patient is not hypoxic   unlikely to be mesenteric ischemia   beta hydro is low, so not alcoholic ketoacidosis  r/o ingestion of other related alcohol drugs  f/u serum osmolality, if high osmolal gap after adjusting to alcohol level, then ingestion of other alcohol related substances   tylenol can cause non explained lactic acidosis, f/u level  fluids   f/u lactic acid at 11     #copd   not in exacerbation     #cirrhosis  the patient pltl count is normal   no splenomegaly   normal INR   normal ast alt, normal bilirubin   bonacini score is low, but ct scan showed nodularity   no clinical or lab evidence of cirrhosis, just on ct scan   ct scan has a specificity and sensetivity og 50-7% to dx cirrhosis, US is more specific  previous US showed no nodularity  will repeat an US     #alcohol abuse  consult CATCH team   treat withdrawals with lorazepam taper   not in dt

## 2022-02-27 NOTE — ED PROVIDER NOTE - CARE PLAN
1 Principal Discharge DX:	Lactic acidosis  Secondary Diagnosis:	Sepsis  Secondary Diagnosis:	Alcoholic ketoacidosis
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
## 2022-02-27 NOTE — ED ADULT TRIAGE NOTE - CHIEF COMPLAINT QUOTE
Pt came from [facility] c/o b/l knee pain, chronic in nature and SOB. Pt came from [facility] c/o b/l knee pain, chronic in nature, nausea and vomiting.

## 2022-02-27 NOTE — H&P ADULT - ATTENDING COMMENTS
57 YO M with a PMH of COPD, HCV (treated), cirrhosis, EtOH abuse, and hx of lung CA s/p resection who presents to the hospital with a c/o N/V (NBNB). Associated with epigastric ABD pain. + EtOH use. ROS is negative except as above.     In the ED, CT-AP w/ IV contrast showed no acute process. Started on IV ABXs (Cefe/VAnc), IVFs (LR), IV steroids, and Duonebs.     FMHx: Reviewed, not relevant    Physical exam shows pt in NAD. VSS, afebrile, not hypoxic on RA. A&Ox3. Neuro exam without deficits, motor/sensory intact, no dysarthria, no facial asymmetry. Muscle strength/sensation intact. CTA B/L with no W/C/R. RRR, no M/G/R. ABD is soft and non-tender, normoactive BSs. LEs without swelling. No rashes. Labs and radiology as above.     N/V + ABD pain, suspect alcoholic gastritis, less likely infectious process. PPI. IVFs (LR). Anti-emetics PRN. PRN pain meds. EtOH cessation.     EtOH abuse + EtOH cessation counseling. Librium taper. CIWA protocol. . IVFs (LR). Trend BMPs, replace electrolytes PRN. Start on Multivitamin/Folate/Thiamine. Monitor VSs. Extensive counseling lasting between 15-30 minutes was held on the benefits of EtOH cessation. Detox consult.    Folate and thiamine deficiency due to EtOH abuse. Start on Folate/Thiamine supplementation prior to glucose admin. IVFs (LR).     HAGMA (High Anion Gap Metabolic Acidosis) from lactic acidosis. IVFs (LR). Repeat lactate and BMP in the AM.     Hypophosphatemia, replace. Repeat in the AM.     Hx of COPD, HCV (treated), cirrhosis, EtOH abuse, and hx of lung CA s/p resection. Restart home meds, except as stated above. DVT PPX. Inform PCP of pt's admission to hospital. My note supersedes the residents note.     Date seen by [email or web address]

## 2022-02-27 NOTE — ED PROVIDER NOTE - NSICDXPASTMEDICALHX_GEN_ALL_CORE_FT
PAST MEDICAL HISTORY:  Cirrhosis     COPD (chronic obstructive pulmonary disease)     GERD (gastroesophageal reflux disease)     Hepatitis C     Kidney mass     Liver mass     Lung mass

## 2022-02-27 NOTE — ED ADULT NURSE NOTE - NSIMPLEMENTINTERV_GEN_ALL_ED
Implemented All Fall Risk Interventions:  [place]erald to call system. Call bell, personal items and telephone within reach. Instruct patient to call for assistance. Room bathroom lighting operational. Non-slip footwear when patient is off stretcher. Physically safe environment: no spills, clutter or unnecessary equipment. Stretcher in lowest position, wheels locked, appropriate side rails in place. Provide visual cue, wrist band, yellow gown, etc. Monitor gait and stability. Monitor for mental status changes and reorient to person, place, and time. Review medications for side effects contributing to fall risk. Reinforce activity limits and safety measures with patient and family.

## 2022-02-27 NOTE — ED PROVIDER NOTE - NS ED ROS FT
Constitutional:  no fevers, no chills, no malaise  Eyes:  No visual changes  ENMT: No neck pain or stiffness, no nasal congestion, no ear pain, no throat pain  Cardiac:  No chest pain  Respiratory:  +SOB  GI:  +N/V  :  No dysuria, frequency or burning.  MS:  No back pain, no joint pain.  Neuro:  No headache, no dizziness, no change in mental status  Skin:  No skin rash  Except as documented in the HPI,  all other systems are negative

## 2022-02-27 NOTE — ED ADULT NURSE NOTE - OBJECTIVE STATEMENT
pt biba from skilled nursing complaining of shortness of breath, nausea and vomiting. pt also sts he has been drinking all day today. denies any chest pain

## 2022-02-27 NOTE — H&P ADULT - NSHPLABSRESULTS_GEN_ALL_CORE
LABS:                          16.1   16.78 )-----------( 237      ( 2022 02:44 )             50.1     Hb Trend: 16.1<--  WBC Trend: 16.78<--  Plt Trend: <--              139  |  98  |  26<H>  ----------------------------<  199<H>  4.9   |  15<L>  |  1.2    Ca    8.6      2022 06:16    TPro  8.4<H>  /  Alb  5.2  /  TBili  0.2  /  DBili  x   /  AST  24  /  ALT  15  /  AlkPhos  75      Troponin T, Serum: <0.01 ng/mL (22 @ 02:44)    PT/INR - ( 2022 05:20 )   PT: 13.30 sec;   INR: 1.16 ratio         PTT - ( 2022 05:20 )  PTT:36.5 sec  Urinalysis Basic - ( 2022 05:43 )    Color: Light Yellow / Appearance: Clear / S.034 / pH: x  Gluc: x / Ketone: Small  / Bili: Negative / Urobili: <2 mg/dL   Blood: x / Protein: 30 mg/dL / Nitrite: Negative   Leuk Esterase: Negative / RBC: 1 /HPF / WBC 5 /HPF   Sq Epi: x / Non Sq Epi: 3 /HPF / Bacteria: Negative      CAPILLARY BLOOD GLUCOSE              IMAGING:

## 2022-02-27 NOTE — ED PROVIDER NOTE - OBJECTIVE STATEMENT
Patient with history of alcohol abuse presents with vomiting for 1 day nonbloody nonbilious not associated with abdominal pain its moderate intensity.  Patient was arrested while drinking.  In assisted patient was complaining of nausea and trouble breathing so was sent to the ED.  There is no aggravating or alleviating symptoms Patient with history of alcohol abuse Liver cirrhosis and possibly cancer presents with vomiting for 1 day nonbloody nonbilious not associated with abdominal pain its moderate intensity.  Patient was arrested while drinking.  In prison patient was complaining of nausea and trouble breathing so was sent to the ED.  There is no aggravating or alleviating symptoms

## 2022-02-27 NOTE — H&P ADULT - NSHPPHYSICALEXAM_GEN_ALL_CORE
VITALS:   T(C): 36.8 (02-27-22 @ 07:12), Max: 37.1 (02-27-22 @ 05:58)  HR: 132 (02-27-22 @ 07:12) (118 - 135)  BP: 174/88 (02-27-22 @ 07:12) (134/96 - 174/88)  RR: 20 (02-27-22 @ 07:12) (20 - 20)  SpO2: 98% (02-27-22 @ 07:12) (98% - 100%)    GENERAL: ,alcohol withdrawals   HEAD:  Atraumatic, normocephalic  EYES: EOMI, PERRLA, conjunctiva and sclera clear  ENT: Moist mucous membranes  NECK: Supple, no JVD  HEART: Regular rate and rhythm, no murmurs, rubs, or gallops  LUNGS: Unlabored respirations.  Clear to auscultation bilaterally, no crackles, wheezing, or rhonchi  ABDOMEN: Soft, nontender, nondistended, +BS  EXTREMITIES: 2+ peripheral pulses bilaterally. No clubbing, cyanosis, or edema  NERVOUS SYSTEM:  A&Ox3, tremors   SKIN: No rashes or lesions

## 2022-02-27 NOTE — ED PROVIDER NOTE - PHYSICAL EXAMINATION
CONSTITUTIONAL: Well-developed; well-nourished; in no acute distress, nontoxic appearing  SKIN: skin exam is warm and dry,  HEAD: Normocephalic; atraumatic.  EYES: PERRL, 3 mm bilateral, no nystagmus, EOM intact; conjunctiva and sclera clear.  ENT: MMM, no nasal congestion  NECK: Supple; non tender.  ROM intact.  CARD: S1, S2 normal, no murmur  RESP: tachypnic, slight wheezing bilaterally  ABD: soft; non-distended; non-tender. No Rebound, No guarding  EXT: Normal ROM. No cyanosis or edema. Dp Pulses intact.   NEURO: awake, alert, following commands, oriented, grossly unremarkable. No Focal deficits. GCS 15.   PSYCH: Cooperative, appropriate. CONSTITUTIONAL: Well-developed; well-nourished; in no acute distress, nontoxic appearing  SKIN: skin exam is warm and dry,  HEAD: Normocephalic; atraumatic.  EYES: PERRL, 3 mm bilateral, no nystagmus, EOM intact; conjunctiva and sclera clear.  ENT: MMM, no nasal congestion  NECK: Supple; non tender.  ROM intact.  CARD: S1, S2 normal, no murmur  RESP: tachypnic, slight wheezing bilaterally  ABD: Soft but distended with mild tenderness no rebound or guarding  EXT: Normal ROM. No cyanosis or edema. Dp Pulses intact.   NEURO: awake, alert, following commands, oriented, grossly unremarkable. No Focal deficits. GCS 15.   PSYCH: Cooperative, appropriate.

## 2022-02-27 NOTE — ED ADULT NURSE REASSESSMENT NOTE - NS ED NURSE REASSESS COMMENT FT1
Received pt A&Ox3, breathing with ease on RA, with IV L AC 18g, L wrist 18g, R hand 20g. Pt currently admitted to SDU, awaiting for clean bed upstairs. Pt placed on CCM, VS stable at this time. [place] bedside. Pt with no complaints at this time. Nursing care continues.

## 2022-02-27 NOTE — ED PROVIDER NOTE - ATTENDING CONTRIBUTION TO CARE
Patient evaluated for nausea and shortness of breath.  Lab work obtained showed acidosis with elevated lactate and low bicarb.  White blood cell count was elevated.  Given these values sepsis was high on the differential so patient was started on IV fluids and antibiotics.  Differential also includes alcoholic ketoacidosis.  Patient will require IV fluids antibiotics and frequent reassessment

## 2022-02-28 LAB
ALBUMIN SERPL ELPH-MCNC: 4 G/DL — SIGNIFICANT CHANGE UP (ref 3.5–5.2)
ALP SERPL-CCNC: 51 U/L — SIGNIFICANT CHANGE UP (ref 30–115)
ALT FLD-CCNC: 13 U/L — SIGNIFICANT CHANGE UP (ref 0–41)
ANION GAP SERPL CALC-SCNC: 10 MMOL/L — SIGNIFICANT CHANGE UP (ref 7–14)
ANION GAP SERPL CALC-SCNC: 11 MMOL/L — SIGNIFICANT CHANGE UP (ref 7–14)
AST SERPL-CCNC: 30 U/L — SIGNIFICANT CHANGE UP (ref 0–41)
BILIRUB SERPL-MCNC: 0.4 MG/DL — SIGNIFICANT CHANGE UP (ref 0.2–1.2)
BUN SERPL-MCNC: 14 MG/DL — SIGNIFICANT CHANGE UP (ref 10–20)
BUN SERPL-MCNC: 15 MG/DL — SIGNIFICANT CHANGE UP (ref 10–20)
CALCIUM SERPL-MCNC: 8.5 MG/DL — SIGNIFICANT CHANGE UP (ref 8.5–10.1)
CALCIUM SERPL-MCNC: 8.7 MG/DL — SIGNIFICANT CHANGE UP (ref 8.5–10.1)
CHLORIDE SERPL-SCNC: 105 MMOL/L — SIGNIFICANT CHANGE UP (ref 98–110)
CHLORIDE SERPL-SCNC: 106 MMOL/L — SIGNIFICANT CHANGE UP (ref 98–110)
CO2 SERPL-SCNC: 24 MMOL/L — SIGNIFICANT CHANGE UP (ref 17–32)
CO2 SERPL-SCNC: 26 MMOL/L — SIGNIFICANT CHANGE UP (ref 17–32)
CREAT SERPL-MCNC: 0.9 MG/DL — SIGNIFICANT CHANGE UP (ref 0.7–1.5)
CREAT SERPL-MCNC: 0.9 MG/DL — SIGNIFICANT CHANGE UP (ref 0.7–1.5)
CULTURE RESULTS: SIGNIFICANT CHANGE UP
EGFR: 100 ML/MIN/1.73M2 — SIGNIFICANT CHANGE UP
GLUCOSE SERPL-MCNC: 131 MG/DL — HIGH (ref 70–99)
GLUCOSE SERPL-MCNC: 145 MG/DL — HIGH (ref 70–99)
HCT VFR BLD CALC: 37.2 % — LOW (ref 42–52)
HCT VFR BLD CALC: 37.5 % — LOW (ref 42–52)
HCV AB S/CO SERPL IA: 75.6 COI — HIGH
HCV AB SERPL-IMP: REACTIVE
HGB BLD-MCNC: 12.5 G/DL — LOW (ref 14–18)
HGB BLD-MCNC: 12.5 G/DL — LOW (ref 14–18)
LACTATE SERPL-SCNC: 2.1 MMOL/L — HIGH (ref 0.7–2)
MAGNESIUM SERPL-MCNC: 2.2 MG/DL — SIGNIFICANT CHANGE UP (ref 1.8–2.4)
MAGNESIUM SERPL-MCNC: 2.4 MG/DL — SIGNIFICANT CHANGE UP (ref 1.8–2.4)
MCHC RBC-ENTMCNC: 29.8 PG — SIGNIFICANT CHANGE UP (ref 27–31)
MCHC RBC-ENTMCNC: 30.3 PG — SIGNIFICANT CHANGE UP (ref 27–31)
MCHC RBC-ENTMCNC: 33.3 G/DL — SIGNIFICANT CHANGE UP (ref 32–37)
MCHC RBC-ENTMCNC: 33.6 G/DL — SIGNIFICANT CHANGE UP (ref 32–37)
MCV RBC AUTO: 89.5 FL — SIGNIFICANT CHANGE UP (ref 80–94)
MCV RBC AUTO: 90.1 FL — SIGNIFICANT CHANGE UP (ref 80–94)
NRBC # BLD: 0 /100 WBCS — SIGNIFICANT CHANGE UP (ref 0–0)
NRBC # BLD: 0 /100 WBCS — SIGNIFICANT CHANGE UP (ref 0–0)
PLATELET # BLD AUTO: 100 K/UL — LOW (ref 130–400)
PLATELET # BLD AUTO: 104 K/UL — LOW (ref 130–400)
POTASSIUM SERPL-MCNC: 3.8 MMOL/L — SIGNIFICANT CHANGE UP (ref 3.5–5)
POTASSIUM SERPL-MCNC: 3.8 MMOL/L — SIGNIFICANT CHANGE UP (ref 3.5–5)
POTASSIUM SERPL-SCNC: 3.8 MMOL/L — SIGNIFICANT CHANGE UP (ref 3.5–5)
POTASSIUM SERPL-SCNC: 3.8 MMOL/L — SIGNIFICANT CHANGE UP (ref 3.5–5)
PROT SERPL-MCNC: 6.3 G/DL — SIGNIFICANT CHANGE UP (ref 6–8)
RBC # BLD: 4.13 M/UL — LOW (ref 4.7–6.1)
RBC # BLD: 4.19 M/UL — LOW (ref 4.7–6.1)
RBC # FLD: 13.4 % — SIGNIFICANT CHANGE UP (ref 11.5–14.5)
RBC # FLD: 13.4 % — SIGNIFICANT CHANGE UP (ref 11.5–14.5)
SODIUM SERPL-SCNC: 141 MMOL/L — SIGNIFICANT CHANGE UP (ref 135–146)
SODIUM SERPL-SCNC: 141 MMOL/L — SIGNIFICANT CHANGE UP (ref 135–146)
SPECIMEN SOURCE: SIGNIFICANT CHANGE UP
WBC # BLD: 6.74 K/UL — SIGNIFICANT CHANGE UP (ref 4.8–10.8)
WBC # BLD: 8.54 K/UL — SIGNIFICANT CHANGE UP (ref 4.8–10.8)
WBC # FLD AUTO: 6.74 K/UL — SIGNIFICANT CHANGE UP (ref 4.8–10.8)
WBC # FLD AUTO: 8.54 K/UL — SIGNIFICANT CHANGE UP (ref 4.8–10.8)

## 2022-02-28 PROCEDURE — 99222 1ST HOSP IP/OBS MODERATE 55: CPT

## 2022-02-28 PROCEDURE — 99233 SBSQ HOSP IP/OBS HIGH 50: CPT

## 2022-02-28 RX ORDER — ENOXAPARIN SODIUM 100 MG/ML
40 INJECTION SUBCUTANEOUS DAILY
Refills: 0 | Status: DISCONTINUED | OUTPATIENT
Start: 2022-02-28 | End: 2022-03-02

## 2022-02-28 RX ADMIN — GABAPENTIN 100 MILLIGRAM(S): 400 CAPSULE ORAL at 06:11

## 2022-02-28 RX ADMIN — Medication 3 MILLIGRAM(S): at 10:46

## 2022-02-28 RX ADMIN — Medication 1 TABLET(S): at 12:23

## 2022-02-28 RX ADMIN — Medication 3 MILLIGRAM(S): at 22:49

## 2022-02-28 RX ADMIN — PANTOPRAZOLE SODIUM 40 MILLIGRAM(S): 20 TABLET, DELAYED RELEASE ORAL at 12:24

## 2022-02-28 RX ADMIN — BUDESONIDE AND FORMOTEROL FUMARATE DIHYDRATE 2 PUFF(S): 160; 4.5 AEROSOL RESPIRATORY (INHALATION) at 22:50

## 2022-02-28 RX ADMIN — Medication 4 MILLIGRAM(S): at 06:00

## 2022-02-28 RX ADMIN — Medication 3 MILLIGRAM(S): at 18:41

## 2022-02-28 RX ADMIN — Medication 100 MILLIGRAM(S): at 12:24

## 2022-02-28 RX ADMIN — GABAPENTIN 100 MILLIGRAM(S): 400 CAPSULE ORAL at 15:53

## 2022-02-28 RX ADMIN — SODIUM CHLORIDE 100 MILLILITER(S): 9 INJECTION, SOLUTION INTRAVENOUS at 04:10

## 2022-02-28 RX ADMIN — Medication 4 MILLIGRAM(S): at 02:01

## 2022-02-28 RX ADMIN — Medication 1 MILLIGRAM(S): at 12:24

## 2022-02-28 RX ADMIN — Medication 3 MILLIGRAM(S): at 15:53

## 2022-02-28 RX ADMIN — GABAPENTIN 100 MILLIGRAM(S): 400 CAPSULE ORAL at 22:50

## 2022-02-28 RX ADMIN — SODIUM CHLORIDE 100 MILLILITER(S): 9 INJECTION, SOLUTION INTRAVENOUS at 18:43

## 2022-02-28 NOTE — CONSULT NOTE ADULT - ASSESSMENT
IMPRESSION:    N/ V/ alcohol abuse/ CT reviewed  ? gastritis  high risk for withdrawal  high LA/ improved with hydration  COPD  liver cirrhosis      PLAN:    CNS: Ativan per CIWA protocol, thiamine, folic acid    HEENT:  Oral care    PULMONARY:  HOB @ 45 degrees, aspiration precaution, Neb as needed    CARDIOVASCULAR: LR 75 CC/H    GI: GI prophylaxis                                          Feeding clear advance as tolerated    RENAL:  F/u  lytes.  Correct as needed. accurate I/O    INFECTIOUS DISEASE: pancx, procal    HEMATOLOGICAL:  DVT prophylaxis.    ENDOCRINE:  Follow up FS.  Insulin protocol if needed.    floor

## 2022-02-28 NOTE — PROGRESS NOTE ADULT - ATTENDING COMMENTS
# N/V abdominal pain -possibly related to alcohol induced gastritis; improved  currently on regular diet    # anion gap acidosis - improved  # Lactic acidosis- improved  possibly related to alcohol and history of cirrhosis  continue IV hydration and monitor    # COPD-stable    # Cirrhosis- OP follow up with GI    #alcohol abuse  patient states he drinks only on weekend. States he was drinking 1/5th over the weekend since he was having issues with wife  on presentation alcohol level was 105  patient counseled  [facility] team consulted # N/V abdominal pain -possibly related to alcohol induced gastritis; improved  currently on regular diet    # anion gap acidosis - improved  # Lactic acidosis- improved  possibly related to alcohol and history of cirrhosis  continue IV hydration and monitor  monitor blood cultures    #alcohol abuse  patient states he drinks only on weekend. States he was drinking 1/5th over the weekend since he was having issues with wife  on presentation alcohol level was 105  patient counseled  CATCh team consulted  CIWA monitoring; on atival protocol    # COPD-stable    # Cirrhosis- OP follow up with GI

## 2022-02-28 NOTE — SBIRT NOTE ADULT - NSSBIRTALCNOACTINTDET_GEN_A_CORE
The  asked pt if he would like a referral to treatment or resources for treatment or counseling. The pt declined referral information at this time.

## 2022-02-28 NOTE — PROGRESS NOTE ADULT - SUBJECTIVE AND OBJECTIVE BOX
SUBJECTIVE:    Patient is a 56y old Male who presents with a chief complaint of N/V (2022 06:23)    Currently admitted to medicine with the primary diagnosis of Lactic acidosis       Today is hospital day 1d. This morning he is resting comfortably in bed and reports no new issues or overnight events.     PAST MEDICAL & SURGICAL HISTORY  Hepatitis C    Cirrhosis    COPD (chronic obstructive pulmonary disease)    Kidney mass    Liver mass    Lung mass    GERD (gastroesophageal reflux disease)    No significant past surgical history      SOCIAL HISTORY:  Negative for smoking/alcohol/drug use.     ALLERGIES:  No Known Allergies    MEDICATIONS:  STANDING MEDICATIONS  budesonide 160 MICROgram(s)/formoterol 4.5 MICROgram(s) Inhaler 2 Puff(s) Inhalation two times a day  dextrose 5% + lactated ringers. 1000 milliLiter(s) IV Continuous <Continuous>  folic acid 1 milliGRAM(s) Oral daily  gabapentin 100 milliGRAM(s) Oral three times a day  LORazepam     Tablet   Oral   LORazepam     Tablet 3 milliGRAM(s) Oral every 4 hours  multivitamin 1 Tablet(s) Oral daily  pantoprazole  Injectable 40 milliGRAM(s) IV Push daily  thiamine 100 milliGRAM(s) Oral daily  tiotropium 18 MICROgram(s) Capsule 1 Capsule(s) Inhalation daily    PRN MEDICATIONS  ALBUTerol    90 MICROgram(s) HFA Inhaler 2 Puff(s) Inhalation every 6 hours PRN  ondansetron Injectable 4 milliGRAM(s) IV Push every 8 hours PRN    VITALS:   T(F): 97.1  HR: 90  BP: 163/84  RR: 18  SpO2: 99%    LABS:                        12.5   8.54  )-----------( 104      ( 2022 08:00 )             37.2         141  |  106  |  15  ----------------------------<  131<H>  3.8   |  24  |  0.9    Ca    8.7      2022 08:00  Phos  1.4       Mg     2.4         TPro  6.3  /  Alb  4.0  /  TBili  0.4  /  DBili  x   /  AST  30  /  ALT  13  /  AlkPhos  51      PT/INR - ( 2022 05:20 )   PT: 13.30 sec;   INR: 1.16 ratio         PTT - ( 2022 05:20 )  PTT:36.5 sec  Urinalysis Basic - ( 2022 05:43 )    Color: Light Yellow / Appearance: Clear / S.034 / pH: x  Gluc: x / Ketone: Small  / Bili: Negative / Urobili: <2 mg/dL   Blood: x / Protein: 30 mg/dL / Nitrite: Negative   Leuk Esterase: Negative / RBC: 1 /HPF / WBC 5 /HPF   Sq Epi: x / Non Sq Epi: 3 /HPF / Bacteria: Negative            Culture - Urine (collected 2022 05:43)  Source: Clean Catch Clean Catch (Midstream)  Final Report (2022 07:56):    <10,000 CFU/mL Normal Urogenital Adrienne    Culture - Blood (collected 2022 05:25)  Source: .Blood Blood-Peripheral  Preliminary Report (2022 12:01):    No growth to date.    Culture - Blood (collected 2022 05:25)  Source: .Blood Blood-Peripheral  Preliminary Report (2022 12:01):    No growth to date.      CARDIAC MARKERS ( 2022 02:44 )  x     / <0.01 ng/mL / x     / x     / x          RADIOLOGY:    PHYSICAL EXAM:  GEN: No acute distress  LUNGS: Clear to auscultation bilaterally   HEART: Regular  ABD: Soft, non-tender, non-distended.  EXT: NC/NC/NE/2+PP/LUNA/Skin Intact.   NEURO: AAOX3    Intravenous access:   NG tube:   Chanel Catheter:

## 2022-03-01 LAB
ALBUMIN SERPL ELPH-MCNC: 4.1 G/DL — SIGNIFICANT CHANGE UP (ref 3.5–5.2)
ALP SERPL-CCNC: 58 U/L — SIGNIFICANT CHANGE UP (ref 30–115)
ALT FLD-CCNC: 23 U/L — SIGNIFICANT CHANGE UP (ref 0–41)
ANION GAP SERPL CALC-SCNC: 11 MMOL/L — SIGNIFICANT CHANGE UP (ref 7–14)
AST SERPL-CCNC: 43 U/L — HIGH (ref 0–41)
BASOPHILS # BLD AUTO: 0.03 K/UL — SIGNIFICANT CHANGE UP (ref 0–0.2)
BASOPHILS NFR BLD AUTO: 0.6 % — SIGNIFICANT CHANGE UP (ref 0–1)
BILIRUB DIRECT SERPL-MCNC: <0.2 MG/DL — SIGNIFICANT CHANGE UP (ref 0–0.3)
BILIRUB INDIRECT FLD-MCNC: >0.2 MG/DL — SIGNIFICANT CHANGE UP (ref 0.2–1.2)
BILIRUB SERPL-MCNC: 0.4 MG/DL — SIGNIFICANT CHANGE UP (ref 0.2–1.2)
BUN SERPL-MCNC: 12 MG/DL — SIGNIFICANT CHANGE UP (ref 10–20)
CALCIUM SERPL-MCNC: 9.2 MG/DL — SIGNIFICANT CHANGE UP (ref 8.5–10.1)
CHLORIDE SERPL-SCNC: 102 MMOL/L — SIGNIFICANT CHANGE UP (ref 98–110)
CO2 SERPL-SCNC: 26 MMOL/L — SIGNIFICANT CHANGE UP (ref 17–32)
CREAT SERPL-MCNC: 0.7 MG/DL — SIGNIFICANT CHANGE UP (ref 0.7–1.5)
EGFR: 108 ML/MIN/1.73M2 — SIGNIFICANT CHANGE UP
EOSINOPHIL # BLD AUTO: 0.08 K/UL — SIGNIFICANT CHANGE UP (ref 0–0.7)
EOSINOPHIL NFR BLD AUTO: 1.7 % — SIGNIFICANT CHANGE UP (ref 0–8)
GLUCOSE SERPL-MCNC: 99 MG/DL — SIGNIFICANT CHANGE UP (ref 70–99)
HCT VFR BLD CALC: 40.7 % — LOW (ref 42–52)
HGB BLD-MCNC: 13.6 G/DL — LOW (ref 14–18)
IMM GRANULOCYTES NFR BLD AUTO: 0.2 % — SIGNIFICANT CHANGE UP (ref 0.1–0.3)
LYMPHOCYTES # BLD AUTO: 1.41 K/UL — SIGNIFICANT CHANGE UP (ref 1.2–3.4)
LYMPHOCYTES # BLD AUTO: 29.5 % — SIGNIFICANT CHANGE UP (ref 20.5–51.1)
MAGNESIUM SERPL-MCNC: 2 MG/DL — SIGNIFICANT CHANGE UP (ref 1.8–2.4)
MCHC RBC-ENTMCNC: 29.8 PG — SIGNIFICANT CHANGE UP (ref 27–31)
MCHC RBC-ENTMCNC: 33.4 G/DL — SIGNIFICANT CHANGE UP (ref 32–37)
MCV RBC AUTO: 89.3 FL — SIGNIFICANT CHANGE UP (ref 80–94)
MONOCYTES # BLD AUTO: 0.32 K/UL — SIGNIFICANT CHANGE UP (ref 0.1–0.6)
MONOCYTES NFR BLD AUTO: 6.7 % — SIGNIFICANT CHANGE UP (ref 1.7–9.3)
NEUTROPHILS # BLD AUTO: 2.93 K/UL — SIGNIFICANT CHANGE UP (ref 1.4–6.5)
NEUTROPHILS NFR BLD AUTO: 61.3 % — SIGNIFICANT CHANGE UP (ref 42.2–75.2)
NRBC # BLD: 0 /100 WBCS — SIGNIFICANT CHANGE UP (ref 0–0)
PLATELET # BLD AUTO: 92 K/UL — LOW (ref 130–400)
POTASSIUM SERPL-MCNC: 4.1 MMOL/L — SIGNIFICANT CHANGE UP (ref 3.5–5)
POTASSIUM SERPL-SCNC: 4.1 MMOL/L — SIGNIFICANT CHANGE UP (ref 3.5–5)
PROT SERPL-MCNC: 6.8 G/DL — SIGNIFICANT CHANGE UP (ref 6–8)
RBC # BLD: 4.56 M/UL — LOW (ref 4.7–6.1)
RBC # FLD: 13.1 % — SIGNIFICANT CHANGE UP (ref 11.5–14.5)
SODIUM SERPL-SCNC: 139 MMOL/L — SIGNIFICANT CHANGE UP (ref 135–146)
WBC # BLD: 4.78 K/UL — LOW (ref 4.8–10.8)
WBC # FLD AUTO: 4.78 K/UL — LOW (ref 4.8–10.8)

## 2022-03-01 PROCEDURE — 99232 SBSQ HOSP IP/OBS MODERATE 35: CPT

## 2022-03-01 PROCEDURE — 76705 ECHO EXAM OF ABDOMEN: CPT | Mod: 26

## 2022-03-01 PROCEDURE — 99233 SBSQ HOSP IP/OBS HIGH 50: CPT

## 2022-03-01 RX ORDER — POLYETHYLENE GLYCOL 3350 17 G/17G
17 POWDER, FOR SOLUTION ORAL
Refills: 0 | Status: DISCONTINUED | OUTPATIENT
Start: 2022-03-01 | End: 2022-03-02

## 2022-03-01 RX ORDER — SENNA PLUS 8.6 MG/1
2 TABLET ORAL AT BEDTIME
Refills: 0 | Status: DISCONTINUED | OUTPATIENT
Start: 2022-03-01 | End: 2022-03-02

## 2022-03-01 RX ORDER — SODIUM CHLORIDE 9 MG/ML
1000 INJECTION, SOLUTION INTRAVENOUS
Refills: 0 | Status: DISCONTINUED | OUTPATIENT
Start: 2022-03-01 | End: 2022-03-02

## 2022-03-01 RX ADMIN — GABAPENTIN 100 MILLIGRAM(S): 400 CAPSULE ORAL at 06:35

## 2022-03-01 RX ADMIN — Medication 3 MILLIGRAM(S): at 03:08

## 2022-03-01 RX ADMIN — Medication 2 MILLIGRAM(S): at 14:03

## 2022-03-01 RX ADMIN — SENNA PLUS 2 TABLET(S): 8.6 TABLET ORAL at 21:58

## 2022-03-01 RX ADMIN — Medication 2 MILLIGRAM(S): at 21:59

## 2022-03-01 RX ADMIN — GABAPENTIN 100 MILLIGRAM(S): 400 CAPSULE ORAL at 21:58

## 2022-03-01 RX ADMIN — TIOTROPIUM BROMIDE 1 CAPSULE(S): 18 CAPSULE ORAL; RESPIRATORY (INHALATION) at 09:51

## 2022-03-01 RX ADMIN — ENOXAPARIN SODIUM 40 MILLIGRAM(S): 100 INJECTION SUBCUTANEOUS at 11:34

## 2022-03-01 RX ADMIN — Medication 1 MILLIGRAM(S): at 11:33

## 2022-03-01 RX ADMIN — Medication 2 MILLIGRAM(S): at 18:55

## 2022-03-01 RX ADMIN — Medication 2 MILLIGRAM(S): at 09:51

## 2022-03-01 RX ADMIN — GABAPENTIN 100 MILLIGRAM(S): 400 CAPSULE ORAL at 14:01

## 2022-03-01 RX ADMIN — BUDESONIDE AND FORMOTEROL FUMARATE DIHYDRATE 2 PUFF(S): 160; 4.5 AEROSOL RESPIRATORY (INHALATION) at 21:58

## 2022-03-01 RX ADMIN — SODIUM CHLORIDE 75 MILLILITER(S): 9 INJECTION, SOLUTION INTRAVENOUS at 11:25

## 2022-03-01 RX ADMIN — Medication 1 TABLET(S): at 11:33

## 2022-03-01 RX ADMIN — BUDESONIDE AND FORMOTEROL FUMARATE DIHYDRATE 2 PUFF(S): 160; 4.5 AEROSOL RESPIRATORY (INHALATION) at 11:38

## 2022-03-01 RX ADMIN — PANTOPRAZOLE SODIUM 40 MILLIGRAM(S): 20 TABLET, DELAYED RELEASE ORAL at 11:34

## 2022-03-01 RX ADMIN — SODIUM CHLORIDE 100 MILLILITER(S): 9 INJECTION, SOLUTION INTRAVENOUS at 06:37

## 2022-03-01 RX ADMIN — Medication 3 MILLIGRAM(S): at 06:35

## 2022-03-01 RX ADMIN — Medication 100 MILLIGRAM(S): at 11:34

## 2022-03-01 NOTE — PROGRESS NOTE ADULT - SUBJECTIVE AND OBJECTIVE BOX
Over Night Events: events noted, on RA, LA improving, afebrile    PHYSICAL EXAM    ICU Vital Signs Last 24 Hrs  T(C): 36.3 (01 Mar 2022 02:54), Max: 36.7 (28 Feb 2022 15:16)  T(F): 97.3 (01 Mar 2022 02:54), Max: 98.1 (28 Feb 2022 15:16)  HR: 98 (01 Mar 2022 02:54) (76 - 99)  BP: 152/84 (01 Mar 2022 02:54) (138/82 - 170/83)  BP(mean): 120 (28 Feb 2022 18:47) (120 - 120)  RR: 18 (01 Mar 2022 02:54) (16 - 18)  SpO2: 99% (01 Mar 2022 02:54) (96% - 99%)      General: comfortbale  Lungs: Bilateral BS  Cardiovascular: Regular   Abdomen: Soft, Positive BS  Extremities: No clubbing   Neurological: Non focal       02-28-22 @ 07:01  -  03-01-22 @ 06:47  --------------------------------------------------------  IN:  Total IN: 0 mL    OUT:    Voided (mL): 600 mL  Total OUT: 600 mL    Total NET: -600 mL          LABS:                          12.5   6.74  )-----------( 100      ( 28 Feb 2022 16:00 )             37.5                                               02-28    141  |  105  |  14  ----------------------------<  145<H>  3.8   |  26  |  0.9    Ca    8.5      28 Feb 2022 11:00  Phos  1.4     02-27  Mg     2.2     02-28    TPro  6.3  /  Alb  4.0  /  TBili  0.4  /  DBili  x   /  AST  30  /  ALT  13  /  AlkPhos  51  02-28                                                                                           LIVER FUNCTIONS - ( 28 Feb 2022 08:00 )  Alb: 4.0 g/dL / Pro: 6.3 g/dL / ALK PHOS: 51 U/L / ALT: 13 U/L / AST: 30 U/L / GGT: x                                                  Culture - Urine (collected 27 Feb 2022 05:43)  Source: Clean Catch Clean Catch (Midstream)  Final Report (28 Feb 2022 07:56):    <10,000 CFU/mL Normal Urogenital Adrienne    Culture - Blood (collected 27 Feb 2022 05:25)  Source: .Blood Blood-Peripheral  Preliminary Report (28 Feb 2022 12:01):    No growth to date.    Culture - Blood (collected 27 Feb 2022 05:25)  Source: .Blood Blood-Peripheral  Preliminary Report (28 Feb 2022 12:01):    No growth to date.                                                                                           MEDICATIONS  (STANDING):  budesonide 160 MICROgram(s)/formoterol 4.5 MICROgram(s) Inhaler 2 Puff(s) Inhalation two times a day  dextrose 5% + lactated ringers. 1000 milliLiter(s) (100 mL/Hr) IV Continuous <Continuous>  enoxaparin Injectable 40 milliGRAM(s) SubCutaneous daily  folic acid 1 milliGRAM(s) Oral daily  gabapentin 100 milliGRAM(s) Oral three times a day  LORazepam     Tablet   Oral   LORazepam     Tablet 2 milliGRAM(s) Oral every 4 hours  multivitamin 1 Tablet(s) Oral daily  pantoprazole  Injectable 40 milliGRAM(s) IV Push daily  thiamine 100 milliGRAM(s) Oral daily  tiotropium 18 MICROgram(s) Capsule 1 Capsule(s) Inhalation daily    MEDICATIONS  (PRN):  ALBUTerol    90 MICROgram(s) HFA Inhaler 2 Puff(s) Inhalation every 6 hours PRN Bronchospasm  ondansetron Injectable 4 milliGRAM(s) IV Push every 8 hours PRN Nausea and/or Vomiting

## 2022-03-01 NOTE — CHART NOTE - NSCHARTNOTEFT_GEN_A_CORE
Transfer Note      Transfer from:   CEU  Transfer to:   Medicine Floor        For Follow-Up:  -pt requesting new walker  -c/w ativan taper  -c/w CIWA monitoring  -DG to floor  -f/u MALIK US  -psych consulted  -pt awaiting arraignment, possibly bedside        HPI:  55 yo male kth copd, cirrhosis, alcohol abuse, treated hep c, hx of stage 1 lung ca sp resection in 2005,  presented for N/V  The patient had some personal issues with his partner and he was arrested by police. Yesterday he was drinking( he drinks 12 beers on each saturday and sunday) and after that hi pain started. He describes the pain sharp, epigastric and non radiating and associated sometimes with reflux.   He also complains of NBNB vomiting that started yesterday.   He denies any diarrhea, fever, chills or any other complain.   On presentation to the ed the patient was found to have lactic acidosis.        HOSPITAL COURSE:  Pt was admitted to medicine and remained in the ED. He received 2L LR bolus and was started on an ativan taper, currently tolerating taper well in CEU. Pt under police custody and awaiting arraignment. Pt to continue ativan taper. Psychiatry consulted per family for worsening agitation at home, home thoughts of self harm and increasing sadness/depression.         ASSESSMENT & PLAN:   55 yo male kth copd, cirrhosis, alcohol abuse, treated hep c, hx of stage 1 lung ca sp resection in 2005,  presented for N/V and abdominal pain       #Alcohol Withdrawal  #Alcohol Abuse  #N/V abdominal pain, likely 2/2 EtOH withdrawal resolving  -CT AP2/27:  No CT evidence of acute intra-abdominal pathology. Cirrhosis and hepatic steatosis.  -CXR:  no acute pathology  -lipase WNL  -EtOH on admission:  110  -Shi negative   -no lyte disturbances   -consulted CATCH team   -not in dt;  no tremors, no sweating, no abdominal pain on exam    -c/w ativan taper (starting 3mg q4hr x 6 doses today)  -c/w CIWA monitoring   -zofran prn   -D5 LR decreased to 75cc/hr    #Aggressive behavior/Early onset dementia   -pt calm and cooperative on examination    -per daughter pt has been increasingly forgetfulness and increased paranoid when drinking and not drinking, also reports increasing aggression when pt drinks. Pt's daughter would like psych to evaluate pt for possible underlying psychiatry diseases as they run in pts family.     #CARMELTIA, resolved  #Lactic acidosis, resolving  - no signs of infection;  s/p 2L LR in ED  -BCxs:  NGTD  -UCx:  normal zena  - beta hydro <0.2 >>1.7   -Lactate:  8 on admission >>2.1  -tylenol level:  negative  -Cr: 1.5>> 0.9    -c/w D5 LR now at 75cc/hr    #COPD  #h/o lung CA, s/p resection 2005  -not in exacerbation, no wheezing or SOB on exam    -c/w home meds    #Cirrhosis  #H/o treated Hep C  #Thrombocytopenia  -CT AP:  cirrhosis and hepatic steatosis  -the patient pltl count is normal   -no splenomegaly   -normal INR   -normal ast alt, normal bilirubin   -bonacini score is low, but ct scan showed nodularity     -f/u RUQ US  -Monitor PLT    #Suspected Thiamine and folate deficiencies  -c/w thiamine and folate supplements    #Diverticulosis on CT  #L renal non obstructing calculi on CT  #Stable Subcentimeter hypodensity of Liver on CT  #B/L too small to characterize renal hypodensities  #small hiatal hernia on CT      DVT ppx:  lovenox 40mg qd  GI ppx:  PPI IV qd  Diet:  Regular   Activity:  Bedrest  Code Status:  Full Code    Dispo:   Acute, continuing ativan taper, downgrade to med/surg floor      Family:    3/1: spoke with pt's daughter Portia (lives in [place]) over the phone, updated her about pt, plan, and answered all questions and concerns.  3/1: per daughter pt has been increasingly forgetfulness and increased paranoid when drinking and not drinking, also reports increasing aggression when pt drinks. Pt's daughter would like psych to evaluate pt for possible underlying psychiatry diseases as they run in pts family.       Provider Handoff: (Pending) - follow up:  -pt requesting new walker  -c/w ativan taper  -c/w CIWA monitoring  -DG to floor  -f/u RUQ US  -psych consulted  -pt awaiting arraignment, possibly bedside

## 2022-03-01 NOTE — PATIENT PROFILE ADULT - FALL HARM RISK - HARM RISK INTERVENTIONS

## 2022-03-01 NOTE — PROGRESS NOTE ADULT - ATTENDING COMMENTS
Overnight events: Patient seen and examined at bedside.   No acute overnight events noted   patient is comfortable in bed, eating breakfast.     # Alcohol Withdrawal  # Alcohol Abuse  # N/V abdominal pain, likely 2/2 heavy alcohol use  - CT AP2/27:  No CT evidence of acute intra-abdominal pathology. Cirrhosis and hepatic steatosis.  - CXR:  no acute pathology  - lipase WNL  - EtOH on admission:  110  - ativan taper and ciwa monitoring   - gentle hydration     # CARMELITA, resolved  # Lactic acidosis, improving   - no signs of infection;  s/p 2L LR in ED  - beta hydro <0.2 >>1.7   - Lactate:  8 on admission >>2.1  - follow repeat level     #COPD  #h/o lung CA, s/p resection 2005  - not in exacerbation    # Cirrhosis  # H/o treated Hep C  # Thrombocytopenia  - CT AP:  cirrhosis and hepatic steatosis  - f/u RUQ US    #Diverticulosis on CT  #L renal non obstructing calculi on CT  #Stable Subcentimeter hypodensity of Liver on CT  #B/L too small to characterize renal hypodensities  #small hiatal hernia on CT  - outpatient follow up     # aggressive behavior/ early onset dementia   # suspected thiamine and folate def   - continue supplement  - patient calm on exam   - s/p aggressive behavior with significant other after drinking - under police custody     Patient is stable for downgrade to medical floor.

## 2022-03-01 NOTE — PROGRESS NOTE ADULT - SUBJECTIVE AND OBJECTIVE BOX
SUBJECTIVE:    Patient is a 56y old Male who presents with a chief complaint of N/V (28 Feb 2022 12:45)    Currently admitted to medicine with the primary diagnosis of Lactic acidosis       Today is hospital day 2d. This morning he is resting comfortably in bed and reports no new issues or overnight events.       PAST MEDICAL & SURGICAL HISTORY  Hepatitis C    Cirrhosis    COPD (chronic obstructive pulmonary disease)    Kidney mass    Liver mass    Lung mass    GERD (gastroesophageal reflux disease)    No significant past surgical history      SOCIAL HISTORY:    ALLERGIES:  No Known Allergies    MEDICATIONS:  STANDING MEDICATIONS  budesonide 160 MICROgram(s)/formoterol 4.5 MICROgram(s) Inhaler 2 Puff(s) Inhalation two times a day  dextrose 5% + lactated ringers. 1000 milliLiter(s) IV Continuous <Continuous>  enoxaparin Injectable 40 milliGRAM(s) SubCutaneous daily  folic acid 1 milliGRAM(s) Oral daily  gabapentin 100 milliGRAM(s) Oral three times a day  LORazepam     Tablet   Oral   LORazepam     Tablet 3 milliGRAM(s) Oral every 4 hours  LORazepam     Tablet 2 milliGRAM(s) Oral every 4 hours  multivitamin 1 Tablet(s) Oral daily  pantoprazole  Injectable 40 milliGRAM(s) IV Push daily  thiamine 100 milliGRAM(s) Oral daily  tiotropium 18 MICROgram(s) Capsule 1 Capsule(s) Inhalation daily    PRN MEDICATIONS  ALBUTerol    90 MICROgram(s) HFA Inhaler 2 Puff(s) Inhalation every 6 hours PRN  ondansetron Injectable 4 milliGRAM(s) IV Push every 8 hours PRN    VITALS:   T(F): 97.3  HR: 98  BP: 152/84  RR: 18  SpO2: 99%    LABS:  Negative for smoking/alcohol/drug use.                         12.5   6.74  )-----------( 100      ( 28 Feb 2022 16:00 )             37.5     02-28    141  |  105  |  14  ----------------------------<  145<H>  3.8   |  26  |  0.9    Ca    8.5      28 Feb 2022 11:00  Phos  1.4     02-27  Mg     2.2     02-28    TPro  6.3  /  Alb  4.0  /  TBili  0.4  /  DBili  x   /  AST  30  /  ALT  13  /  AlkPhos  51  02-28      Lactate, Blood: 2.1 mmol/L *H* (02-28-22 @ 11:00)      Culture - Urine (collected 27 Feb 2022 05:43)  Source: Clean Catch Clean Catch (Midstream)  Final Report (28 Feb 2022 07:56):    <10,000 CFU/mL Normal Urogenital Adrienne    Culture - Blood (collected 27 Feb 2022 05:25)  Source: .Blood Blood-Peripheral  Preliminary Report (28 Feb 2022 12:01):    No growth to date.    Culture - Blood (collected 27 Feb 2022 05:25)  Source: .Blood Blood-Peripheral  Preliminary Report (28 Feb 2022 12:01):    No growth to date.        RADIOLOGY:  CT Abdomen and Pelvis w/ IV Cont (02.27.22 @ 04:38)   IMPRESSION:  No CT evidence of acute intra-abdominal pathology.  Cirrhosis and hepatic steatosis.      Xray Chest 1 View- PORTABLE-Urgent (02.27.22 @ 02:55)   Impression:  No radiographic evidence of acute cardiopulmonary disease.      PHYSICAL EXAM:  GEN: No acute distress  HEENT: normocephalic, atraumatic, aniceteric  LUNGS: Clear to auscultation bilaterally, no rales/wheezing/ rhonchi  HEART: S1/S2 present. RRR, no murmurs  ABD: Soft, non-tender, non-distended. Bowel sounds present  EXT: N  NEURO: A, normal affect      ASSESSMENT AND PLAN:  55 yo male kth copd, cirrhosis, alcohol abuse, treated hep c, hx of stage 1 lung ca sp resection in 2005,  presented for N/V and abdominal pain       # N/V abdominal pain   - could be gastritis  - r/o pancreatitis, f/u lipase  - Shi negative   - no lyte disturbances     - zofran prn   - fluids     #Lactic acidosis  - no signs of infection   - patient is not hypoxic   - unlikely to be mesenteric ischemia   - beta hydro <0.2 >>1.7     - r/o ingestion of other related alcohol drugs  - f/u serum osmolality, if high osmolal gap after adjusting to alcohol level, then ingestion of other alcohol related substances   - tylenol can cause non explained lactic acidosis, f/u level  - fluids  - f/u lactic acid at 11     #COPD  -not in exacerbation     #Cirrhosis  - the patient pltl count is normal   - no splenomegaly   - normal INR   - normal ast alt, normal bilirubin   - bonacini score is low, but ct scan showed nodularity   - no clinical or lab evidence of cirrhosis, just on ct scan   - ct scan has a specificity and sensitivity of 50-7% to dx cirrhosis, US is more specific  - previous US showed no nodularity    - will repeat an US     #Alcohol Abuse  -consulted CATCH team   -not in dt     -treat withdrawals with lorazepam taper       DVT ppx:  lovenox 40mg qd  GI ppx:  PPI IV qd  Diet:  Regular   Activity:  Bedrest  Code Status:  Full Code    Dispo:   Acute,       Provider Handoff: (Pending) - follow up:  - SUBJECTIVE:    Patient is a 56y old Male who presents with a chief complaint of N/V (28 Feb 2022 12:45)    Currently admitted to medicine with the primary diagnosis of Lactic acidosis       Today is hospital day 2d. This morning he is resting comfortably in bed and reports no new issues or overnight events.       PAST MEDICAL & SURGICAL HISTORY  Hepatitis C    Cirrhosis    COPD (chronic obstructive pulmonary disease)    Kidney mass    Liver mass    Lung mass    GERD (gastroesophageal reflux disease)    No significant past surgical history      SOCIAL HISTORY:    ALLERGIES:  No Known Allergies    MEDICATIONS:  STANDING MEDICATIONS  budesonide 160 MICROgram(s)/formoterol 4.5 MICROgram(s) Inhaler 2 Puff(s) Inhalation two times a day  dextrose 5% + lactated ringers. 1000 milliLiter(s) IV Continuous <Continuous>  enoxaparin Injectable 40 milliGRAM(s) SubCutaneous daily  folic acid 1 milliGRAM(s) Oral daily  gabapentin 100 milliGRAM(s) Oral three times a day  LORazepam     Tablet   Oral   LORazepam     Tablet 3 milliGRAM(s) Oral every 4 hours  LORazepam     Tablet 2 milliGRAM(s) Oral every 4 hours  multivitamin 1 Tablet(s) Oral daily  pantoprazole  Injectable 40 milliGRAM(s) IV Push daily  thiamine 100 milliGRAM(s) Oral daily  tiotropium 18 MICROgram(s) Capsule 1 Capsule(s) Inhalation daily    PRN MEDICATIONS  ALBUTerol    90 MICROgram(s) HFA Inhaler 2 Puff(s) Inhalation every 6 hours PRN  ondansetron Injectable 4 milliGRAM(s) IV Push every 8 hours PRN    VITALS:   T(F): 97.3  HR: 98  BP: 152/84  RR: 18  SpO2: 99%    LABS:  Negative for smoking/alcohol/drug use.                         12.5   6.74  )-----------( 100      ( 28 Feb 2022 16:00 )             37.5     02-28    141  |  105  |  14  ----------------------------<  145<H>  3.8   |  26  |  0.9    Ca    8.5      28 Feb 2022 11:00  Phos  1.4     02-27  Mg     2.2     02-28    TPro  6.3  /  Alb  4.0  /  TBili  0.4  /  DBili  x   /  AST  30  /  ALT  13  /  AlkPhos  51  02-28      Lactate, Blood: 2.1 mmol/L *H* (02-28-22 @ 11:00)      Culture - Urine (collected 27 Feb 2022 05:43)  Source: Clean Catch Clean Catch (Midstream)  Final Report (28 Feb 2022 07:56):    <10,000 CFU/mL Normal Urogenital Zena    Culture - Blood (collected 27 Feb 2022 05:25)  Source: .Blood Blood-Peripheral  Preliminary Report (28 Feb 2022 12:01):    No growth to date.    Culture - Blood (collected 27 Feb 2022 05:25)  Source: .Blood Blood-Peripheral  Preliminary Report (28 Feb 2022 12:01):    No growth to date.        RADIOLOGY:  CT Abdomen and Pelvis w/ IV Cont (02.27.22 @ 04:38)   IMPRESSION:  No CT evidence of acute intra-abdominal pathology.  Cirrhosis and hepatic steatosis.      Xray Chest 1 View- PORTABLE-Urgent (02.27.22 @ 02:55)   Impression:  No radiographic evidence of acute cardiopulmonary disease.      PHYSICAL EXAM:  GEN: Pt was seen and examined at bedside. No tremors, no sweating  HEENT: normocephalic, atraumatic, aniceteric  LUNGS: Clear to auscultation bilaterally, no rales/wheezing/ rhonchi  HEART: S1/S2 present. RRR, no murmurs  ABD: Soft, non-tender, non-distended. Bowel sounds present  EXT: No LE edema noted, no UE edema, pulse regular  NEURO: Alert and awake      ASSESSMENT AND PLAN:  57 yo male kth copd, cirrhosis, alcohol abuse, treated hep c, hx of stage 1 lung ca sp resection in 2005,  presented for N/V and abdominal pain       #Alcohol Withdrawal  #Alcohol Abuse  #N/V abdominal pain, likely 2/2 EtOH withdrawal resolving  -CT AP2/27:  No CT evidence of acute intra-abdominal pathology. Cirrhosis and hepatic steatosis.  -CXR:  no acute pathology  -lipase WNL  -EtOH on admission:  110  -Shi negative   -no lyte disturbances   -consulted CATCH team   -not in dt;  no tremors, no sweating, no abdominal pain on exam    -c/w ativan taper (starting 3mg q4hr x 6 doses today)  -c/w CIWA monitoring   -zofran prn   -D5 LR decreased to 75cc/hr    #CARMELITA, resolved  #Lactic acidosis, resolving  - no signs of infection;  s/p 2L LR in ED  -BCxs:  NGTD  -UCx:  normal zena  - beta hydro <0.2 >>1.7   -Lactate:  8 on admission >>2.1  -tylenol level:  negative  -Cr: 1.5>> 0.9    -c/w D5 LR now at 75cc/hr    #COPD  #h/o lung CA, s/p resection 2005  -not in exacerbation, no wheezing or SOB on exam    -c/w home meds    #Cirrhosis  #H/o treated Hep C  #Thrombocytopenia  -CT AP:  cirrhosis and hepatic steatosis  -the patient pltl count is normal   -no splenomegaly   -normal INR   -normal ast alt, normal bilirubin   -bonacini score is low, but ct scan showed nodularity     -f/u RUQ US  -Monitor PLT    #Diverticulosis on CT  #L renal non obstructing calculi on CT  #Stable Subcentimeter hypodensity of Liver on CT  #B/L too small to characterize renal hypodensities  #small hiatal hernia on CT      DVT ppx:  lovenox 40mg qd  GI ppx:  PPI IV qd  Diet:  Regular   Activity:  Bedrest  Code Status:  Full Code    Dispo:   Acute, continuing ativan taper, downgrade to med/surg floor      Provider Handoff: (Pending) - follow up:  -pt requesting new walker  -c/w ativan taper  -c/w CIWA monitoring  -DG to floor  -f/u RUQ US SUBJECTIVE:    Patient is a 56y old Male who presents with a chief complaint of N/V (28 Feb 2022 12:45)    Currently admitted to medicine with the primary diagnosis of Lactic acidosis       Today is hospital day 2d. This morning he is resting comfortably in bed and reports no new issues or overnight events.       PAST MEDICAL & SURGICAL HISTORY  Hepatitis C    Cirrhosis    COPD (chronic obstructive pulmonary disease)    Kidney mass    Liver mass    Lung mass    GERD (gastroesophageal reflux disease)    No significant past surgical history      SOCIAL HISTORY:    ALLERGIES:  No Known Allergies    MEDICATIONS:  STANDING MEDICATIONS  budesonide 160 MICROgram(s)/formoterol 4.5 MICROgram(s) Inhaler 2 Puff(s) Inhalation two times a day  dextrose 5% + lactated ringers. 1000 milliLiter(s) IV Continuous <Continuous>  enoxaparin Injectable 40 milliGRAM(s) SubCutaneous daily  folic acid 1 milliGRAM(s) Oral daily  gabapentin 100 milliGRAM(s) Oral three times a day  LORazepam     Tablet   Oral   LORazepam     Tablet 3 milliGRAM(s) Oral every 4 hours  LORazepam     Tablet 2 milliGRAM(s) Oral every 4 hours  multivitamin 1 Tablet(s) Oral daily  pantoprazole  Injectable 40 milliGRAM(s) IV Push daily  thiamine 100 milliGRAM(s) Oral daily  tiotropium 18 MICROgram(s) Capsule 1 Capsule(s) Inhalation daily    PRN MEDICATIONS  ALBUTerol    90 MICROgram(s) HFA Inhaler 2 Puff(s) Inhalation every 6 hours PRN  ondansetron Injectable 4 milliGRAM(s) IV Push every 8 hours PRN    VITALS:   T(F): 97.3  HR: 98  BP: 152/84  RR: 18  SpO2: 99%    LABS:  Negative for smoking/alcohol/drug use.                         12.5   6.74  )-----------( 100      ( 28 Feb 2022 16:00 )             37.5     02-28    141  |  105  |  14  ----------------------------<  145<H>  3.8   |  26  |  0.9    Ca    8.5      28 Feb 2022 11:00  Phos  1.4     02-27  Mg     2.2     02-28    TPro  6.3  /  Alb  4.0  /  TBili  0.4  /  DBili  x   /  AST  30  /  ALT  13  /  AlkPhos  51  02-28      Lactate, Blood: 2.1 mmol/L *H* (02-28-22 @ 11:00)      Culture - Urine (collected 27 Feb 2022 05:43)  Source: Clean Catch Clean Catch (Midstream)  Final Report (28 Feb 2022 07:56):    <10,000 CFU/mL Normal Urogenital Zena    Culture - Blood (collected 27 Feb 2022 05:25)  Source: .Blood Blood-Peripheral  Preliminary Report (28 Feb 2022 12:01):    No growth to date.    Culture - Blood (collected 27 Feb 2022 05:25)  Source: .Blood Blood-Peripheral  Preliminary Report (28 Feb 2022 12:01):    No growth to date.        RADIOLOGY:  CT Abdomen and Pelvis w/ IV Cont (02.27.22 @ 04:38)   IMPRESSION:  No CT evidence of acute intra-abdominal pathology.  Cirrhosis and hepatic steatosis.      Xray Chest 1 View- PORTABLE-Urgent (02.27.22 @ 02:55)   Impression:  No radiographic evidence of acute cardiopulmonary disease.      PHYSICAL EXAM:  GEN: Pt was seen and examined at bedside. No tremors, no sweating  HEENT: normocephalic, atraumatic, aniceteric  LUNGS: Clear to auscultation bilaterally, no rales/wheezing/ rhonchi  HEART: S1/S2 present. RRR, no murmurs  ABD: Soft, non-tender, non-distended. Bowel sounds present  EXT: No LE edema noted, no UE edema, pulse regular  NEURO: Alert and awake      ASSESSMENT AND PLAN:  55 yo male kth copd, cirrhosis, alcohol abuse, treated hep c, hx of stage 1 lung ca sp resection in 2005,  presented for N/V and abdominal pain       #Alcohol Withdrawal  #Alcohol Abuse  #N/V abdominal pain, likely 2/2 EtOH withdrawal resolving  -CT AP2/27:  No CT evidence of acute intra-abdominal pathology. Cirrhosis and hepatic steatosis.  -CXR:  no acute pathology  -lipase WNL  -EtOH on admission:  110  -Shi negative   -no lyte disturbances   -consulted CATCH team   -not in dt;  no tremors, no sweating, no abdominal pain on exam    -c/w ativan taper (starting 3mg q4hr x 6 doses today)  -c/w CIWA monitoring   -zofran prn   -D5 LR decreased to 75cc/hr    #CARMELITA, resolved  #Lactic acidosis, resolving  - no signs of infection;  s/p 2L LR in ED  -BCxs:  NGTD  -UCx:  normal zena  - beta hydro <0.2 >>1.7   -Lactate:  8 on admission >>2.1  -tylenol level:  negative  -Cr: 1.5>> 0.9    -c/w D5 LR now at 75cc/hr    #COPD  #h/o lung CA, s/p resection 2005  -not in exacerbation, no wheezing or SOB on exam    -c/w home meds    #Cirrhosis  #H/o treated Hep C  #Thrombocytopenia  -CT AP:  cirrhosis and hepatic steatosis  -the patient pltl count is normal   -no splenomegaly   -normal INR   -normal ast alt, normal bilirubin   -bonacini score is low, but ct scan showed nodularity     -f/u RUQ US  -Monitor PLT    #Diverticulosis on CT  #L renal non obstructing calculi on CT  #Stable Subcentimeter hypodensity of Liver on CT  #B/L too small to characterize renal hypodensities  #small hiatal hernia on CT      DVT ppx:  lovenox 40mg qd  GI ppx:  PPI IV qd  Diet:  Regular   Activity:  Bedrest  Code Status:  Full Code    Dispo:   Acute, continuing ativan taper, downgrade to med/surg floor      Family:    3/1: spoke with pt's daughter Portia (lives in [place]) over the phone, updated her about pt, plan, and answered all questions and concerns.  3/1: per daughter pt has been increasingly forgetfulness and increased paranoid when drinking and not drinking, also reports increasing aggression when pt drinks. Pt's daughter would like psych to evaluate pt for possible underlying psychiatry diseases as they run in pts family.       Provider Handoff: (Pending) - follow up:  -pt requesting new walker  -c/w ativan taper  -c/w CIWA monitoring  -DG to floor  -f/u RUQ US  -psych? SUBJECTIVE:    Patient is a 56y old Male who presents with a chief complaint of N/V (28 Feb 2022 12:45)    Currently admitted to medicine with the primary diagnosis of Lactic acidosis       Today is hospital day 2d. This morning he is resting comfortably in bed and reports no new issues or overnight events.       PAST MEDICAL & SURGICAL HISTORY  Hepatitis C    Cirrhosis    COPD (chronic obstructive pulmonary disease)    Kidney mass    Liver mass    Lung mass    GERD (gastroesophageal reflux disease)    No significant past surgical history      SOCIAL HISTORY:    ALLERGIES:  No Known Allergies    MEDICATIONS:  STANDING MEDICATIONS  budesonide 160 MICROgram(s)/formoterol 4.5 MICROgram(s) Inhaler 2 Puff(s) Inhalation two times a day  dextrose 5% + lactated ringers. 1000 milliLiter(s) IV Continuous <Continuous>  enoxaparin Injectable 40 milliGRAM(s) SubCutaneous daily  folic acid 1 milliGRAM(s) Oral daily  gabapentin 100 milliGRAM(s) Oral three times a day  LORazepam     Tablet   Oral   LORazepam     Tablet 3 milliGRAM(s) Oral every 4 hours  LORazepam     Tablet 2 milliGRAM(s) Oral every 4 hours  multivitamin 1 Tablet(s) Oral daily  pantoprazole  Injectable 40 milliGRAM(s) IV Push daily  thiamine 100 milliGRAM(s) Oral daily  tiotropium 18 MICROgram(s) Capsule 1 Capsule(s) Inhalation daily    PRN MEDICATIONS  ALBUTerol    90 MICROgram(s) HFA Inhaler 2 Puff(s) Inhalation every 6 hours PRN  ondansetron Injectable 4 milliGRAM(s) IV Push every 8 hours PRN    VITALS:   T(F): 97.3  HR: 98  BP: 152/84  RR: 18  SpO2: 99%    LABS:  Negative for smoking/alcohol/drug use.                         12.5   6.74  )-----------( 100      ( 28 Feb 2022 16:00 )             37.5     02-28    141  |  105  |  14  ----------------------------<  145<H>  3.8   |  26  |  0.9    Ca    8.5      28 Feb 2022 11:00  Phos  1.4     02-27  Mg     2.2     02-28    TPro  6.3  /  Alb  4.0  /  TBili  0.4  /  DBili  x   /  AST  30  /  ALT  13  /  AlkPhos  51  02-28      Lactate, Blood: 2.1 mmol/L *H* (02-28-22 @ 11:00)      Culture - Urine (collected 27 Feb 2022 05:43)  Source: Clean Catch Clean Catch (Midstream)  Final Report (28 Feb 2022 07:56):    <10,000 CFU/mL Normal Urogenital Zena    Culture - Blood (collected 27 Feb 2022 05:25)  Source: .Blood Blood-Peripheral  Preliminary Report (28 Feb 2022 12:01):    No growth to date.    Culture - Blood (collected 27 Feb 2022 05:25)  Source: .Blood Blood-Peripheral  Preliminary Report (28 Feb 2022 12:01):    No growth to date.        RADIOLOGY:  CT Abdomen and Pelvis w/ IV Cont (02.27.22 @ 04:38)   IMPRESSION:  No CT evidence of acute intra-abdominal pathology.  Cirrhosis and hepatic steatosis.      Xray Chest 1 View- PORTABLE-Urgent (02.27.22 @ 02:55)   Impression:  No radiographic evidence of acute cardiopulmonary disease.      PHYSICAL EXAM:  GEN: Pt was seen and examined at bedside. No tremors, no sweating  HEENT: normocephalic, atraumatic, aniceteric  LUNGS: Clear to auscultation bilaterally, no rales/wheezing/ rhonchi  HEART: S1/S2 present. RRR, no murmurs  ABD: Soft, non-tender, non-distended. Bowel sounds present  EXT: No LE edema noted, no UE edema, pulse regular  NEURO: Alert and awake      ASSESSMENT AND PLAN:  57 yo male kth copd, cirrhosis, alcohol abuse, treated hep c, hx of stage 1 lung ca sp resection in 2005,  presented for N/V and abdominal pain       #Alcohol Withdrawal  #Alcohol Abuse  #N/V abdominal pain, likely 2/2 EtOH withdrawal resolving  -CT AP2/27:  No CT evidence of acute intra-abdominal pathology. Cirrhosis and hepatic steatosis.  -CXR:  no acute pathology  -lipase WNL  -EtOH on admission:  110  -Shi negative   -no lyte disturbances   -consulted CATCH team   -not in dt;  no tremors, no sweating, no abdominal pain on exam    -c/w ativan taper (starting 3mg q4hr x 6 doses today)  -c/w CIWA monitoring   -zofran prn   -D5 LR decreased to 75cc/hr    #CARMELITA, resolved  #Lactic acidosis, resolving  - no signs of infection;  s/p 2L LR in ED  -BCxs:  NGTD  -UCx:  normal zena  - beta hydro <0.2 >>1.7   -Lactate:  8 on admission >>2.1  -tylenol level:  negative  -Cr: 1.5>> 0.9    -c/w D5 LR now at 75cc/hr    #COPD  #h/o lung CA, s/p resection 2005  -not in exacerbation, no wheezing or SOB on exam    -c/w home meds    #Cirrhosis  #H/o treated Hep C  #Thrombocytopenia  -CT AP:  cirrhosis and hepatic steatosis  -the patient pltl count is normal   -no splenomegaly   -normal INR   -normal ast alt, normal bilirubin   -bonacini score is low, but ct scan showed nodularity     -f/u RUQ US  -Monitor PLT    #Diverticulosis on CT  #L renal non obstructing calculi on CT  #Stable Subcentimeter hypodensity of Liver on CT  #B/L too small to characterize renal hypodensities  #small hiatal hernia on CT      DVT ppx:  lovenox 40mg qd  GI ppx:  PPI IV qd  Diet:  Regular   Activity:  Bedrest  Code Status:  Full Code    Dispo:   Acute, continuing ativan taper, downgrade to med/surg floor      Family:    3/1: spoke with pt's daughter Portia (lives in [place]) over the phone, updated her about pt, plan, and answered all questions and concerns.  3/1: per daughter pt has been increasingly forgetfulness and increased paranoid when drinking and not drinking, also reports increasing aggression when pt drinks. Pt's daughter would like psych to evaluate pt for possible underlying psychiatry diseases as they run in pts family.       Provider Handoff: (Pending) - follow up:  -pt requesting new walker  -c/w ativan taper  -c/w CIWA monitoring  -DG to floor  -f/u RUQ US  -psych?  -pt awaiting arraignment, possibly bedside SUBJECTIVE:    Patient is a 56y old Male who presents with a chief complaint of N/V (28 Feb 2022 12:45)    Currently admitted to medicine with the primary diagnosis of Lactic acidosis       Today is hospital day 2d. This morning he is resting comfortably in bed and reports no new issues or overnight events.       PAST MEDICAL & SURGICAL HISTORY  Hepatitis C    Cirrhosis    COPD (chronic obstructive pulmonary disease)    Kidney mass    Liver mass    Lung mass    GERD (gastroesophageal reflux disease)    No significant past surgical history      SOCIAL HISTORY:    ALLERGIES:  No Known Allergies    MEDICATIONS:  STANDING MEDICATIONS  budesonide 160 MICROgram(s)/formoterol 4.5 MICROgram(s) Inhaler 2 Puff(s) Inhalation two times a day  dextrose 5% + lactated ringers. 1000 milliLiter(s) IV Continuous <Continuous>  enoxaparin Injectable 40 milliGRAM(s) SubCutaneous daily  folic acid 1 milliGRAM(s) Oral daily  gabapentin 100 milliGRAM(s) Oral three times a day  LORazepam     Tablet   Oral   LORazepam     Tablet 3 milliGRAM(s) Oral every 4 hours  LORazepam     Tablet 2 milliGRAM(s) Oral every 4 hours  multivitamin 1 Tablet(s) Oral daily  pantoprazole  Injectable 40 milliGRAM(s) IV Push daily  thiamine 100 milliGRAM(s) Oral daily  tiotropium 18 MICROgram(s) Capsule 1 Capsule(s) Inhalation daily    PRN MEDICATIONS  ALBUTerol    90 MICROgram(s) HFA Inhaler 2 Puff(s) Inhalation every 6 hours PRN  ondansetron Injectable 4 milliGRAM(s) IV Push every 8 hours PRN    VITALS:   T(F): 97.3  HR: 98  BP: 152/84  RR: 18  SpO2: 99%    LABS:  Negative for smoking/alcohol/drug use.                         12.5   6.74  )-----------( 100      ( 28 Feb 2022 16:00 )             37.5     02-28    141  |  105  |  14  ----------------------------<  145<H>  3.8   |  26  |  0.9    Ca    8.5      28 Feb 2022 11:00  Phos  1.4     02-27  Mg     2.2     02-28    TPro  6.3  /  Alb  4.0  /  TBili  0.4  /  DBili  x   /  AST  30  /  ALT  13  /  AlkPhos  51  02-28      Lactate, Blood: 2.1 mmol/L *H* (02-28-22 @ 11:00)      Culture - Urine (collected 27 Feb 2022 05:43)  Source: Clean Catch Clean Catch (Midstream)  Final Report (28 Feb 2022 07:56):    <10,000 CFU/mL Normal Urogenital Zena    Culture - Blood (collected 27 Feb 2022 05:25)  Source: .Blood Blood-Peripheral  Preliminary Report (28 Feb 2022 12:01):    No growth to date.    Culture - Blood (collected 27 Feb 2022 05:25)  Source: .Blood Blood-Peripheral  Preliminary Report (28 Feb 2022 12:01):    No growth to date.        RADIOLOGY:  CT Abdomen and Pelvis w/ IV Cont (02.27.22 @ 04:38)   IMPRESSION:  No CT evidence of acute intra-abdominal pathology.  Cirrhosis and hepatic steatosis.      Xray Chest 1 View- PORTABLE-Urgent (02.27.22 @ 02:55)   Impression:  No radiographic evidence of acute cardiopulmonary disease.      PHYSICAL EXAM:  GEN: Pt was seen and examined at bedside. No tremors, no sweating  HEENT: normocephalic, atraumatic, aniceteric  LUNGS: Clear to auscultation bilaterally, no rales/wheezing/ rhonchi  HEART: S1/S2 present. RRR, no murmurs  ABD: Soft, non-tender, non-distended. Bowel sounds present  EXT: No LE edema noted, no UE edema, pulse regular  NEURO: Alert and awake      ASSESSMENT AND PLAN:  57 yo male kth copd, cirrhosis, alcohol abuse, treated hep c, hx of stage 1 lung ca sp resection in 2005,  presented for N/V and abdominal pain       #Alcohol Withdrawal  #Alcohol Abuse  #N/V abdominal pain, likely 2/2 EtOH withdrawal resolving  -CT AP2/27:  No CT evidence of acute intra-abdominal pathology. Cirrhosis and hepatic steatosis.  -CXR:  no acute pathology  -lipase WNL  -EtOH on admission:  110  -Shi negative   -no lyte disturbances   -consulted CATCH team   -not in dt;  no tremors, no sweating, no abdominal pain on exam    -c/w ativan taper (starting 3mg q4hr x 6 doses today)  -c/w CIWA monitoring   -zofran prn   -D5 LR decreased to 75cc/hr    #Aggressive behavior/Early onset dementia   -pt calm and cooperative on examination    -per daughter pt has been increasingly forgetfulness and increased paranoid when drinking and not drinking, also reports increasing aggression when pt drinks. Pt's daughter would like psych to evaluate pt for possible underlying psychiatry diseases as they run in pts family.     #CARMELITA, resolved  #Lactic acidosis, resolving  - no signs of infection;  s/p 2L LR in ED  -BCxs:  NGTD  -UCx:  normal zena  - beta hydro <0.2 >>1.7   -Lactate:  8 on admission >>2.1  -tylenol level:  negative  -Cr: 1.5>> 0.9    -c/w D5 LR now at 75cc/hr    #COPD  #h/o lung CA, s/p resection 2005  -not in exacerbation, no wheezing or SOB on exam    -c/w home meds    #Cirrhosis  #H/o treated Hep C  #Thrombocytopenia  -CT AP:  cirrhosis and hepatic steatosis  -the patient pltl count is normal   -no splenomegaly   -normal INR   -normal ast alt, normal bilirubin   -bonacini score is low, but ct scan showed nodularity     -f/u RUQ US  -Monitor PLT    #Suspected Thiamine and folate deficiencies  -c/w thiamine and folate supplements    #Diverticulosis on CT  #L renal non obstructing calculi on CT  #Stable Subcentimeter hypodensity of Liver on CT  #B/L too small to characterize renal hypodensities  #small hiatal hernia on CT      DVT ppx:  lovenox 40mg qd  GI ppx:  PPI IV qd  Diet:  Regular   Activity:  Bedrest  Code Status:  Full Code    Dispo:   Acute, continuing ativan taper, downgrade to med/surg floor      Family:    3/1: spoke with pt's daughter Portia (lives in [place]) over the phone, updated her about pt, plan, and answered all questions and concerns.  3/1: per daughter pt has been increasingly forgetfulness and increased paranoid when drinking and not drinking, also reports increasing aggression when pt drinks. Pt's daughter would like psych to evaluate pt for possible underlying psychiatry diseases as they run in pts family.       Provider Handoff: (Pending) - follow up:  -pt requesting new walker  -c/w ativan taper  -c/w CIWA monitoring  -DG to floor  -f/u RUQ US  -psych?  -pt awaiting arraignment, possibly bedside

## 2022-03-01 NOTE — CONSULT NOTE ADULT - SUBJECTIVE AND OBJECTIVE BOX
57 yo male kth copd, cirrhosis, alcohol abuse, treated hep c, hx of stage 1 lung ca sp resection in 2005,  presented for N/V and abdominal pain.  Addiction medicine consulted as patient has hx of alcohol use disorder. Spoke with medical resident who reports that patient is not currently in withdrawal, is tolerating Ativan taper well.    CATCH team social workers will see patient shortly.
Patient is a 56y old  Male who presents with a chief complaint of N/V (2022 09:11)    57 yo male PMHX copd, cirrhosis, alcohol abuse, treated hep c, hx of stage 1 lung ca sp resection in ,  presented for N/V  The patient had some personal issues with his partner and he was arrested by police. Yesterday he was drinking( he drinks 12 beers on each saturday and ) and after that ABD pain started. He describes the pain sharp, epigastric and non radiating and associated sometimes with reflux.   He also complains of NBNB vomiting that started yesterday.   He denies any diarrhea, fever, chills or any other complain.   On presentation to the ed the patient was found to have lactic acidosis 8, In ED, was given IVF/ ABX, repeat LA improved, sp CT, feels better, denies pain      PAST MEDICAL & SURGICAL HISTORY:  Hepatitis C    Cirrhosis    COPD (chronic obstructive pulmonary disease)    Kidney mass    Liver mass    Lung mass    GERD (gastroesophageal reflux disease)    No significant past surgical history        SOCIAL HX:   Smoking  +                       ETOH  +      Allergies    No Known Allergies    Intolerances        ALBUTerol    90 MICROgram(s) HFA Inhaler 2 Puff(s) Inhalation every 6 hours PRN  budesonide 160 MICROgram(s)/formoterol 4.5 MICROgram(s) Inhaler 2 Puff(s) Inhalation two times a day  dextrose 5% + lactated ringers. 1000 milliLiter(s) IV Continuous <Continuous>  folic acid 1 milliGRAM(s) Oral daily  gabapentin 100 milliGRAM(s) Oral three times a day  LORazepam     Tablet   Oral   LORazepam     Tablet 3 milliGRAM(s) Oral every 4 hours  multivitamin 1 Tablet(s) Oral daily  ondansetron Injectable 4 milliGRAM(s) IV Push every 8 hours PRN  pantoprazole  Injectable 40 milliGRAM(s) IV Push daily  thiamine 100 milliGRAM(s) Oral daily  tiotropium 18 MICROgram(s) Capsule 1 Capsule(s) Inhalation daily  : Home Meds:      PHYSICAL EXAM    ICU Vital Signs Last 24 Hrs  T(C): 37.1 (2022 06:00), Max: 37.2 (2022 02:00)  T(F): 98.7 (2022 06:00), Max: 99 (2022 02:00)  HR: 77 (2022 06:00) (77 - 132)  BP: 155/70 (2022 06:00) (112/68 - 178/78)  RR: 18 (2022 06:00) (17 - 21)  SpO2: 97% (2022 06:00) (96% - 98%)      General: comfortable  Lungs: Bilateral BS  Cardiovascular: Regular  Abdomen: Soft, Positive BS  Extremities: No clubbing  Skin: Warm  Neurological: Non focal         LABS:                          16.1   16.78 )-----------( 237      ( 2022 02:44 )             50.1                                                   139  |  103  |  21<H>  ----------------------------<  206<H>  4.3   |  21  |  1.1    Ca    8.5      2022 11:00  Phos  1.4       Mg     2.0         TPro  7.0  /  Alb  4.3  /  TBili  0.3  /  DBili  <0.2  /  AST  22  /  ALT  14  /  AlkPhos  61  27      PT/INR - ( 2022 05:20 )   PT: 13.30 sec;   INR: 1.16 ratio         PTT - ( 2022 05:20 )  PTT:36.5 sec                                       Urinalysis Basic - ( 2022 05:43 )    Color: Light Yellow / Appearance: Clear / S.034 / pH: x  Gluc: x / Ketone: Small  / Bili: Negative / Urobili: <2 mg/dL   Blood: x / Protein: 30 mg/dL / Nitrite: Negative   Leuk Esterase: Negative / RBC: 1 /HPF / WBC 5 /HPF   Sq Epi: x / Non Sq Epi: 3 /HPF / Bacteria: Negative        CARDIAC MARKERS ( 2022 02:44 )  x     / <0.01 ng/mL / x     / x     / x                                                LIVER FUNCTIONS - ( 2022 11:00 )  Alb: 4.3 g/dL / Pro: 7.0 g/dL / ALK PHOS: 61 U/L / ALT: 14 U/L / AST: 22 U/L / GGT: x                                                                                                                                    MEDICATIONS  (STANDING):  budesonide 160 MICROgram(s)/formoterol 4.5 MICROgram(s) Inhaler 2 Puff(s) Inhalation two times a day  dextrose 5% + lactated ringers. 1000 milliLiter(s) (100 mL/Hr) IV Continuous <Continuous>  folic acid 1 milliGRAM(s) Oral daily  gabapentin 100 milliGRAM(s) Oral three times a day  LORazepam     Tablet   Oral   LORazepam     Tablet 3 milliGRAM(s) Oral every 4 hours  multivitamin 1 Tablet(s) Oral daily  pantoprazole  Injectable 40 milliGRAM(s) IV Push daily  thiamine 100 milliGRAM(s) Oral daily  tiotropium 18 MICROgram(s) Capsule 1 Capsule(s) Inhalation daily    MEDICATIONS  (PRN):  ALBUTerol    90 MICROgram(s) HFA Inhaler 2 Puff(s) Inhalation every 6 hours PRN Bronchospasm  ondansetron Injectable 4 milliGRAM(s) IV Push every 8 hours PRN Nausea and/or Vomiting

## 2022-03-02 ENCOUNTER — TRANSCRIPTION ENCOUNTER (OUTPATIENT)
Age: 57
End: 2022-03-02

## 2022-03-02 VITALS
HEART RATE: 75 BPM | RESPIRATION RATE: 18 BRPM | DIASTOLIC BLOOD PRESSURE: 94 MMHG | SYSTOLIC BLOOD PRESSURE: 151 MMHG | TEMPERATURE: 97 F

## 2022-03-02 DIAGNOSIS — E87.2 ACIDOSIS: ICD-10-CM

## 2022-03-02 DIAGNOSIS — F10.10 ALCOHOL ABUSE, UNCOMPLICATED: ICD-10-CM

## 2022-03-02 DIAGNOSIS — R11.2 NAUSEA WITH VOMITING, UNSPECIFIED: ICD-10-CM

## 2022-03-02 DIAGNOSIS — N17.9 ACUTE KIDNEY FAILURE, UNSPECIFIED: ICD-10-CM

## 2022-03-02 DIAGNOSIS — F10.239 ALCOHOL DEPENDENCE WITH WITHDRAWAL, UNSPECIFIED: ICD-10-CM

## 2022-03-02 LAB
ALBUMIN SERPL ELPH-MCNC: 4.5 G/DL — SIGNIFICANT CHANGE UP (ref 3.5–5.2)
ALP SERPL-CCNC: 62 U/L — SIGNIFICANT CHANGE UP (ref 30–115)
ALT FLD-CCNC: 27 U/L — SIGNIFICANT CHANGE UP (ref 0–41)
ANION GAP SERPL CALC-SCNC: 15 MMOL/L — HIGH (ref 7–14)
APTT BLD: 34 SEC — SIGNIFICANT CHANGE UP (ref 27–39.2)
AST SERPL-CCNC: 35 U/L — SIGNIFICANT CHANGE UP (ref 0–41)
BASOPHILS # BLD AUTO: 0.04 K/UL — SIGNIFICANT CHANGE UP (ref 0–0.2)
BASOPHILS NFR BLD AUTO: 0.8 % — SIGNIFICANT CHANGE UP (ref 0–1)
BILIRUB SERPL-MCNC: 1 MG/DL — SIGNIFICANT CHANGE UP (ref 0.2–1.2)
BLD GP AB SCN SERPL QL: SIGNIFICANT CHANGE UP
BUN SERPL-MCNC: 12 MG/DL — SIGNIFICANT CHANGE UP (ref 10–20)
CALCIUM SERPL-MCNC: 9.3 MG/DL — SIGNIFICANT CHANGE UP (ref 8.5–10.1)
CHLORIDE SERPL-SCNC: 103 MMOL/L — SIGNIFICANT CHANGE UP (ref 98–110)
CO2 SERPL-SCNC: 21 MMOL/L — SIGNIFICANT CHANGE UP (ref 17–32)
CREAT SERPL-MCNC: 0.9 MG/DL — SIGNIFICANT CHANGE UP (ref 0.7–1.5)
EGFR: 100 ML/MIN/1.73M2 — SIGNIFICANT CHANGE UP
EOSINOPHIL # BLD AUTO: 0.17 K/UL — SIGNIFICANT CHANGE UP (ref 0–0.7)
EOSINOPHIL NFR BLD AUTO: 3.4 % — SIGNIFICANT CHANGE UP (ref 0–8)
GLUCOSE SERPL-MCNC: 102 MG/DL — HIGH (ref 70–99)
HCT VFR BLD CALC: 43.8 % — SIGNIFICANT CHANGE UP (ref 42–52)
HGB BLD-MCNC: 14.7 G/DL — SIGNIFICANT CHANGE UP (ref 14–18)
IMM GRANULOCYTES NFR BLD AUTO: 0.4 % — HIGH (ref 0.1–0.3)
INR BLD: 0.99 RATIO — SIGNIFICANT CHANGE UP (ref 0.65–1.3)
LYMPHOCYTES # BLD AUTO: 1.37 K/UL — SIGNIFICANT CHANGE UP (ref 1.2–3.4)
LYMPHOCYTES # BLD AUTO: 27.7 % — SIGNIFICANT CHANGE UP (ref 20.5–51.1)
MAGNESIUM SERPL-MCNC: 1.8 MG/DL — SIGNIFICANT CHANGE UP (ref 1.8–2.4)
MCHC RBC-ENTMCNC: 29.8 PG — SIGNIFICANT CHANGE UP (ref 27–31)
MCHC RBC-ENTMCNC: 33.6 G/DL — SIGNIFICANT CHANGE UP (ref 32–37)
MCV RBC AUTO: 88.8 FL — SIGNIFICANT CHANGE UP (ref 80–94)
MONOCYTES # BLD AUTO: 0.36 K/UL — SIGNIFICANT CHANGE UP (ref 0.1–0.6)
MONOCYTES NFR BLD AUTO: 7.3 % — SIGNIFICANT CHANGE UP (ref 1.7–9.3)
NEUTROPHILS # BLD AUTO: 2.98 K/UL — SIGNIFICANT CHANGE UP (ref 1.4–6.5)
NEUTROPHILS NFR BLD AUTO: 60.4 % — SIGNIFICANT CHANGE UP (ref 42.2–75.2)
NRBC # BLD: 0 /100 WBCS — SIGNIFICANT CHANGE UP (ref 0–0)
PHOSPHATE SERPL-MCNC: 3.5 MG/DL — SIGNIFICANT CHANGE UP (ref 2.1–4.9)
PLATELET # BLD AUTO: 93 K/UL — LOW (ref 130–400)
POTASSIUM SERPL-MCNC: 4.4 MMOL/L — SIGNIFICANT CHANGE UP (ref 3.5–5)
POTASSIUM SERPL-SCNC: 4.4 MMOL/L — SIGNIFICANT CHANGE UP (ref 3.5–5)
PROT SERPL-MCNC: 7 G/DL — SIGNIFICANT CHANGE UP (ref 6–8)
PROTHROM AB SERPL-ACNC: 11.4 SEC — SIGNIFICANT CHANGE UP (ref 9.95–12.87)
RBC # BLD: 4.93 M/UL — SIGNIFICANT CHANGE UP (ref 4.7–6.1)
RBC # FLD: 13 % — SIGNIFICANT CHANGE UP (ref 11.5–14.5)
SODIUM SERPL-SCNC: 139 MMOL/L — SIGNIFICANT CHANGE UP (ref 135–146)
WBC # BLD: 4.94 K/UL — SIGNIFICANT CHANGE UP (ref 4.8–10.8)
WBC # FLD AUTO: 4.94 K/UL — SIGNIFICANT CHANGE UP (ref 4.8–10.8)

## 2022-03-02 PROCEDURE — 99239 HOSP IP/OBS DSCHRG MGMT >30: CPT

## 2022-03-02 PROCEDURE — 99221 1ST HOSP IP/OBS SF/LOW 40: CPT

## 2022-03-02 RX ORDER — THIAMINE MONONITRATE (VIT B1) 100 MG
1 TABLET ORAL
Qty: 30 | Refills: 3
Start: 2022-03-02 | End: 2022-06-29

## 2022-03-02 RX ORDER — FOLIC ACID 0.8 MG
1 TABLET ORAL
Qty: 30 | Refills: 3
Start: 2022-03-02 | End: 2022-06-29

## 2022-03-02 RX ADMIN — GABAPENTIN 100 MILLIGRAM(S): 400 CAPSULE ORAL at 06:01

## 2022-03-02 RX ADMIN — Medication 2 MILLIGRAM(S): at 03:11

## 2022-03-02 RX ADMIN — BUDESONIDE AND FORMOTEROL FUMARATE DIHYDRATE 2 PUFF(S): 160; 4.5 AEROSOL RESPIRATORY (INHALATION) at 09:34

## 2022-03-02 RX ADMIN — TIOTROPIUM BROMIDE 1 CAPSULE(S): 18 CAPSULE ORAL; RESPIRATORY (INHALATION) at 08:58

## 2022-03-02 RX ADMIN — Medication 1 MILLIGRAM(S): at 10:42

## 2022-03-02 NOTE — BEHAVIORAL HEALTH ASSESSMENT NOTE - DIFFERENTIAL
Aortic Stenosis   WHAT YOU NEED TO KNOW:   What is aortic stenosis? Aortic stenosis is a condition where the aortic valve in your heart is narrowed  The aortic valve is between the left ventricle and the aorta  The left ventricle is the lower left chamber of your heart  The aorta is a blood vessel that pumps blood to your head and body  The aortic valve opens and closes to direct blood flow through your heart  When the aortic valve is narrowed, blood flow may decrease  Your tissues and organs will not have enough oxygen and nutrients to function properly  What causes aortic stenosis? · Calcium buildup:  As you age, calcium can build up on the aortic valve walls  The calcium stiffens and thickens the valve  · Congenital heart defect:  Some people are born with a damaged aortic valve that leads to narrowing and blockage  · Rheumatic fever: This is fever and inflammation of your joints  It can develop after you have a strep throat infection  Rheumatic fever can cause inflammation and damage to your aortic valve  The walls of your aortic valve may narrow, and may even join together  What are the signs and symptoms of aortic stenosis? · Chest pain or tightness    · Fast, jumpy, or fluttery heartbeat    · Shortness of breath during activity or when you lie down    · Severe tiredness    · Dizziness or feeling faint  How is aortic stenosis diagnosed? Your healthcare provider will ask about your signs and symptoms and listen to your heart  He will ask if you have had strep throat or rheumatic fever in the past  You may need any of the following tests:  · Blood tests: You may need blood taken to give caregivers information about how your body is working  The blood may be taken from your hand, arm, or IV  · Chest x-ray: This is used to check the size of your heart and look for fluid around your heart and lungs  · EKG: This test records the electrical activity of your heart   It is used to check for abnormal heart rhythm caused by aortic stenosis  · An echocardiogram  is a type of ultrasound  Sound waves are used to show the structure and function of your heart  · A stress test  may show the changes that take place in your heart while it is under stress  Stress may be placed on your heart with exercise or medicine  Ask for more information about this test     · Cardiac catheterization: This procedure is done to find and treat heart blockages  A thin, bendable tube is inserted into your arm, neck, or groin and moved into your heart  An x-ray may be used to guide the tube to the right place  Dye may be put into your vein so the pictures show up better on a monitor  Tell the healthcare provider if you have ever had an allergic reaction to contrast dye  How is aortic stenosis treated? · Medicines: You may have the following medicines to improve your symptoms or prevent problems caused by aortic stenosis:    ¨ Cholesterol medicine: This medicine will help decrease the amount of cholesterol in your blood  ¨ Antibiotics: This medicine will help fight or prevent an infection  You may need it if you had rheumatic fever in the past  You may need to take the medicine every day, or once a month  · Procedures:      ¨ Valvotomy: This helps widen your aortic valve and allow blood to flow through easier  A catheter (long thin tube) with a balloon on the tip is inserted through a small incision in your arm or groin  The catheter is guided through a blood vessel and into your left atrium near your aortic valve  When the balloon is inflated, it stretches the valve opening  ¨ Valvuloplasty:  Healthcare providers make an incision in your chest to repair and widen your aortic valve  The valve walls are  or calcium buildup is removed  This helps improve the blood flow through your heart      ¨ Replacement:  Healthcare Providers make an incision in your chest to replace your damaged aortic valve  Part or all of your aortic valve is removed, and a new valve is secured in place  The new valve may be from a donor (another person or animal), or may be a manmade valve  What are the risks of aortic stenosis? Aortic stenosis may cause endocarditis  Endocarditis is an infection of the inner lining of the heart  Aortic stenosis can also cause congestive heart failure (CHF)  This is when the heart cannot pump enough blood for the body  This may cause irregular heartbeats and can lead to cardiac arrest (the heart stops beating)  This can be life-threatening  How can I manage my symptoms? · Eat a variety of healthy foods:  Healthy foods include fruits, vegetables, whole-grain breads, low-fat dairy products, beans, lean meats, and fish  Ask if you need to be on a special diet  · Exercise: This will improve your heart health  Ask your healthcare provider about the best exercise plan for you  · Maintain a healthy weight:  Ask your healthcare provider how much you should weigh  Ask him to help you create a weight loss plan if you are overweight  When should I contact my healthcare provider? · You are bleeding from your nose or gums  · The veins in your neck look swollen or are bulging  · You have a fever  · You have blood in your urine or bowel movements  · You have questions or concerns about your condition or care  When should I seek immediate care or call 911? · Your arm or leg feels warm, tender, and painful  It may look swollen and red  · Your heart is beating faster than normal for you, and you feel fluttering in your chest     · You suddenly feel lightheaded and short of breath  · You have chest pain that feels like squeezing, pressure, fullness, or pain  · You have chest pain that lasts for more than a few minutes or returns  · You are nauseated and have trouble breathing  · You have a severe headache, cold sweats, and feel lightheaded or dizzy      · You have weakness or numbness on one side of your arm, leg, or face  · You are confused and cannot speak clearly  CARE AGREEMENT:   You have the right to help plan your care  Learn about your health condition and how it may be treated  Discuss treatment options with your caregivers to decide what care you want to receive  You always have the right to refuse treatment  The above information is an  only  It is not intended as medical advice for individual conditions or treatments  Talk to your doctor, nurse or pharmacist before following any medical regimen to see if it is safe and effective for you  © 2017 [facility] Information is for End User's use only and may not be sold, redistributed or otherwise used for commercial purposes  All illustrations and images included in CareNotes® are the copyrighted property of A D [employer] M [employer]  or [employer]  See above

## 2022-03-02 NOTE — PROGRESS NOTE ADULT - ASSESSMENT
IMPRESSION:    N/ V/ alcohol abuse/ CT reviewed  ? gastritis  high risk for withdrawal  high LA/ improved with hydration  COPD  liver cirrhosis      PLAN:    CNS: Ativan per CIWA protocol, thiamine, folic acid    HEENT:  Oral care    PULMONARY:  HOB @ 45 degrees, aspiration precaution, Neb as needed    CARDIOVASCULAR: LR 75 CC/H    GI: GI prophylaxis                                          Feeding clear advance as tolerated    RENAL:  F/u  lytes.  Correct as needed. accurate I/O    INFECTIOUS DISEASE: pancx, procal    HEMATOLOGICAL:  DVT prophylaxis.    ENDOCRINE:  Follow up FS.  Insulin protocol if needed.    floor    
55 yo male kth copd, cirrhosis, alcohol abuse, treated hep c, hx of stage 1 lung ca sp resection in 2005,  presented for N/V and abdominal pain       #Alcohol Withdrawal  #Alcohol Abuse  #N/V abdominal pain, likely 2/2 EtOH withdrawal resolving  CT AP2/27:  No CT evidence of acute intra-abdominal pathology. Cirrhosis and hepatic steatosis.  CXR:  no acute pathology  lipase WNL  EtOH on admission:  110  Shi negative   no lyte disturbances   consulted CATCH team   not in dt;  no tremors, no sweating, no abdominal pain on exam    c/w ativan taper (starting 3mg q4hr x 6 doses today)  c/w CIWA monitoring - low CIWA  zofran prn   D5 LR decreased to 75cc/hr    #Aggressive behavior/Early onset dementia   pt calm and cooperative on examination    per daughter pt has been increasingly forgetfulness and increased paranoid when drinking and not drinking, also reports increasing aggression when pt drinks. Pt's daughter would like psych to evaluate pt for possible underlying psychiatry diseases as they run in pts family.   f/u Psych recs    #CARMELITA, resolved  #Lactic acidosis, resolving  - no signs of infection;  s/p 2L LR in ED  -BCxs:  NGTD  -UCx:  normal zena  - beta hydro <0.2 >>1.7   -Lactate:  8 on admission >>2.1  -tylenol level:  negative  -Cr: 1.5>> 0.9  c/w D5 LR now at 75cc/hr    #COPD  #h/o lung CA, s/p resection 2005  -not in exacerbation, no wheezing or SOB on exam    -c/w home meds    #Cirrhosis  #H/o treated Hep C  #Thrombocytopenia  -CT AP:  cirrhosis and hepatic steatosis  US Abd: Mildly nodular liver contour with coarsened echotexture suggesting early   cirrhotic changes.  -the patient pltl count is normal   -no splenomegaly   -normal INR   -normal ast alt, normal bilirubin   -bonacini score is low, but ct scan showed nodularity   -Monitor PLT    #Suspected Thiamine and folate deficiencies  -c/w thiamine and folate supplements    #Diverticulosis on CT  #L renal non obstructing calculi on CT  #Stable Subcentimeter hypodensity of Liver on CT  #B/L too small to characterize renal hypodensities  #small hiatal hernia on CT      DVT ppx:  lovenox 40mg qd  GI ppx:  PPI IV qd  Diet:  Regular   Activity:  Bedrest  Code Status:  Full Code            Progress Note Handoff:   Pending:  pt requesting new walker  c/w ativan taper  c/w CIWA monitoring  psych  Dispo:   Acute, continuing ativan taper
57 yo male kth copd, cirrhosis, alcohol abuse, treated hep c, hx of stage 1 lung ca sp resection in 2005,  presented for N/V and abdominal pain     # N/V abdominal pain   - could be gastritis  - r/o pancreatitis, f/u lipase  - Shi negative   - no lyte disturbances   - zofran prn   - fluids     # Lactic acidosis  - no signs of infection   - patient is not hypoxic   - unlikely to be mesenteric ischemia   - beta hydro <0.2 >>1.7   - r/o ingestion of other related alcohol drugs  - f/u serum osmolality, if high osmolal gap after adjusting to alcohol level, then ingestion of other alcohol related substances   - tylenol can cause non explained lactic acidosis, f/u level  - fluids   - f/u lactic acid at 11     # COPD  - not in exacerbation     # Cirrhosis  - the patient pltl count is normal   - no splenomegaly   - normal INR   - normal ast alt, normal bilirubin   - bonacini score is low, but ct scan showed nodularity   - no clinical or lab evidence of cirrhosis, just on ct scan   - ct scan has a specificity and sensitivity of 50-7% to dx cirrhosis, US is more specific  - previous US showed no nodularity  - will repeat an US     #alcohol abuse  consult CATCH team   treat withdrawals with lorazepam taper   not in dt

## 2022-03-02 NOTE — DISCHARGE NOTE PROVIDER - NSDCCPCAREPLAN_GEN_ALL_CORE_FT
PRINCIPAL DISCHARGE DIAGNOSIS  Diagnosis: Cirrhosis  Assessment and Plan of Treatment: early cirrhosis noted on imaging   abstain from alcohol   follow up with Gastroentrology      SECONDARY DISCHARGE DIAGNOSES  Diagnosis: Alcohol abuse  Assessment and Plan of Treatment: CATCH Team following   The  asked pt if he would like a referral to treatment or resources for treatment or counseling. The pt declined referral information at this time.      Diagnosis: Sepsis  Assessment and Plan of Treatment: resolved    Diagnosis: Alcoholic ketoacidosis  Assessment and Plan of Treatment: resolved

## 2022-03-02 NOTE — BEHAVIORAL HEALTH ASSESSMENT NOTE - PAST PSYCHOTROPIC MEDICATION
He was previously in treatment with a psychiatrist and was prescribed medication, however he did not like the effects associated with taking the medications, therefore he stopped taking them.

## 2022-03-02 NOTE — DISCHARGE NOTE PROVIDER - NSDCMRMEDTOKEN_GEN_ALL_CORE_FT
albuterol 90 mcg/inh inhalation aerosol: 2 puff(s) inhaled every 6 hours, As Needed  amitriptyline 10 mg oral tablet: 1 tab(s) orally 2 times a day  cycloSPORINE 0.09% ophthalmic solution: 1 drop(s) to each affected eye every 12 hours  gabapentin 100 mg oral capsule: 1 cap(s) orally 3 times a day  Spiriva 18 mcg inhalation capsule: 1 cap(s) inhaled once a day  Symbicort 160 mcg-4.5 mcg/inh inhalation aerosol: 2 puff(s) inhaled 2 times a day

## 2022-03-02 NOTE — BEHAVIORAL HEALTH ASSESSMENT NOTE - OTHER
as above in a commited relationship with girlfriend for over a decade and refers to her as his wife in a committed relationship with girlfriend for over a decade and refers to her as his wife Concerns Patient sat on his bed and was observed to be able to move all limbs with appropriately

## 2022-03-02 NOTE — BEHAVIORAL HEALTH ASSESSMENT NOTE - ACTIVATING EVENTS/STRESSORS
Triggering events leading to humiliation, shame, and/or despair (e.g. Loss of relationship, financial or health status) (real or anticipated)/Current or pending social isolation/Non-compliant or not receiving treatment/Acute medical problem/Perceived burden on family or others/Legal problems/Inadequate social supports

## 2022-03-02 NOTE — BEHAVIORAL HEALTH ASSESSMENT NOTE - SUICIDE RISK FACTORS
Hopelessness or despair/Alcohol/Substance abuse disorders/Access to lethal methods (pills, firearm, etc.: Ask specifically about presence or absence of a firearm in the home or ease of accessing

## 2022-03-02 NOTE — BEHAVIORAL HEALTH ASSESSMENT NOTE - NSBHCHARTREVIEWLAB_PSY_A_CORE FT
14.7   4.94  )-----------( 93       ( 02 Mar 2022 04:30 )             43.8   03-02    139  |  103  |  12  ----------------------------<  102<H>  4.4   |  21  |  0.9    Ca    9.3      02 Mar 2022 04:30  Phos  3.5     03-02  Mg     1.8     03-02    TPro  7.0  /  Alb  4.5  /  TBili  1.0  /  DBili  x   /  AST  35  /  ALT  27  /  AlkPhos  62  03-02

## 2022-03-02 NOTE — DISCHARGE NOTE PROVIDER - CARE PROVIDER_API CALL
Anne Raines)  Internal Medicine  [address] [place] [id number]  Phone: [phone]  Fax: [phone]  Follow Up Time: 2 weeks

## 2022-03-02 NOTE — BEHAVIORAL HEALTH ASSESSMENT NOTE - SUMMARY
56 y male with COPD, Cirrhosis, psychiatric history of Alcohol Use Disorder, Cocaine Use Disorder, in remission, treated Hep C, hx of stage 1 Lung Ca s/p resection in 2005, admitted for nausea and vomiting with lactic acidosis, started on Ativan taper, and currently under arrest,  Psychiatry consult placed for mental health evaluation.  On evaluation, patient was observed to be cooperative, with no overt withdrawal symptoms. He expressed guilt for the event leading to his current arrest, but has no feeling of hopelessness or worthlessness. He would like to be home in order to be able to think of his future. He acknowledge the excessive drinking is the main trigger for all of this, however, motivation is still low with this patient. At this time there is no acute psychiatric symptoms present. Patient is no suicidal, he is not homicidal and there is no psychosis present. He is not expressing any homicidal ideation toward her partner.     Impression  Marijuana use  Alcohol use disorder with withdrawal  Cocaine use disorder in remission  r/o substance induced mood disorder    Plan  Agree to continue ativan taper for alcohol use  -No acute indication to start this patient on psychotropic medication  -He would strongly benefit from substance use program, but motivation is low.     CATCH team is on board.  -There is no psychiatric contraindication to discharge this patient.

## 2022-03-02 NOTE — DISCHARGE NOTE PROVIDER - HOSPITAL COURSE
57 yo male kth copd, cirrhosis, alcohol abuse, treated hep c, hx of stage 1 lung ca sp resection in 2005,  presented for N/V and abdominal pain       #Alcohol Withdrawal  #Alcohol Abuse  #N/V abdominal pain, likely 2/2 EtOH withdrawal resolving  CT AP2/27:  No CT evidence of acute intra-abdominal pathology. Cirrhosis and hepatic steatosis.  CXR:  no acute pathology  lipase WNL  EtOH on admission:  110  Shi negative   no lyte disturbances   consulted CATCH team   not in dt;  no tremors, no sweating, no abdominal pain on exam   pt wanting to go home    c/w ativan taper (starting 3mg q4hr x 6 doses today)  c/w CIWA monitoring - low CIWA  zofran prn   D5 LR decreased to 75cc/hr    #Aggressive behavior/Early onset dementia   pt calm and cooperative on examination    per daughter pt has been increasingly forgetfulness and increased paranoid when drinking and not drinking, also reports increasing aggression when pt drinks. Pt's daughter would like psych to evaluate pt for possible underlying psychiatry diseases as they run in pts family.   Psych recs - not suicidal - cleared by psych for discharge    #CARMELITA, resolved  #Lactic acidosis, resolving  - no signs of infection;  s/p 2L LR in ED  -BCxs:  NGTD  -UCx:  normal zena  - beta hydro <0.2 >>1.7   -Lactate:  8 on admission >>2.1  -tylenol level:  negative  -Cr: 1.5>> 0.9  c/w D5 LR now at 75cc/hr    #COPD  #h/o lung CA, s/p resection 2005  -not in exacerbation, no wheezing or SOB on exam    -c/w home meds    #Cirrhosis  #H/o treated Hep C  #Thrombocytopenia  -CT AP:  cirrhosis and hepatic steatosis  US Abd: Mildly nodular liver contour with coarsened echotexture suggesting early   cirrhotic changes.  -the patient pltl count is normal   -no splenomegaly   -normal INR   -normal ast alt, normal bilirubin   -bonacini score is low, but ct scan showed nodularity   -Monitor PLT    #Suspected Thiamine and folate deficiencies  -c/w thiamine and folate supplements    #Diverticulosis on CT  #L renal non obstructing calculi on CT  #Stable Subcentimeter hypodensity of Liver on CT  #B/L too small to characterize renal hypodensities  #small hiatal hernia on CT      DVT ppx:  lovenox 40mg qd  GI ppx:  PPI IV qd  Diet:  Regular   Activity:  Bedrest  Code Status:  Full Code

## 2022-03-02 NOTE — BEHAVIORAL HEALTH ASSESSMENT NOTE - NSBHCHARTREVIEWINVESTIGATE_PSY_A_CORE FT
< from: 12 Lead ECG (02.27.22 @ 06:06) >    Ventricular Rate 122 BPM    Atrial Rate 122 BPM    P-R Interval 130 ms    QRS Duration 126 ms    Q-T Interval 352 ms    QTC Calculation(Bazett) 501 ms    P Axis 40 degrees    R Axis 114 degrees    T Axis -6 degrees    Diagnosis Line Sinus tachycardia  Right bundle branch block  Left posterior fascicular block  *** Bifascicular block ***  T wave abnormality, consider inferior ischemia  Abnormal ECG    Confirmed by Gilmer Nevarez ([id number]) on 2/27/2022 2:52:31 PM    < end of copied text >

## 2022-03-02 NOTE — BEHAVIORAL HEALTH ASSESSMENT NOTE - NSBHSOCIALHXDETAILSFT_PSY_A_CORE
Born in [place], came to the [place] at the age of 2. He is not , but has a long term female partner. He is unemployed. Patient also has a 38 year old daughter who currently resides in [place].

## 2022-03-02 NOTE — BEHAVIORAL HEALTH ASSESSMENT NOTE - HPI (INCLUDE ILLNESS QUALITY, SEVERITY, DURATION, TIMING, CONTEXT, MODIFYING FACTORS, ASSOCIATED SIGNS AND SYMPTOMS)
Patient is a 56 y o  male, domiciled alone in apartment as of November 2021, in long term committed relationship with girlfriend whom he refers to as his wife, father to one child, unemployed on disability, with past medical history of COPD, Cirrhosis, Alcohol Use Disorder, Cocaine Use Disorder, in remission, treated Hep C, hx of stage 1 Lung Ca s/p resection in 2005, admitted for nausea and vomiting with lactic acidosis, started on Ativan taper. Psychiatry consult placed for mental health evaluation.     On evaluation, patient is sitting up in bed, under arrest w/  at bedside.  He reports being under arrest due to going to his girlfriends house after a phone argument with her and banging on her door after which she called the police.  At time of arrest, patient developed nausea and vomiting and was brought to the hospital. Patient is a 56 y o  male, domiciled alone in apartment as of November 2021, in long term committed relationship with girlfriend whom he refers to as his wife, father to one child, unemployed on disability, with past medical history of COPD, Cirrhosis, Alcohol Use Disorder, Cocaine Use Disorder, in remission, treated Hep C, hx of stage 1 Lung Ca s/p resection in 2005, admitted for nausea and vomiting with lactic acidosis, started on Ativan taper, and under arrest,  Psychiatry consult placed for mental health evaluation.     On evaluation, patient is sitting up in bed, under arrest w/  at bedside.  He reports being under arrest due to going to his girlfriends house after a phone argument with her and banging on her door after which she called the police.  At time of arrest, patient developed nausea and vomiting and was brought to the hospital.  On evaluation, patient was very preoccupied with his current legal issue that arise from argument with his partner. He denied the event as being intentional to hurt his partner. He denied prior history of domestic abuse or arrest related to domestic abuse, and currently he is not exhibiting any homicidal or suicidal ideation. In fact patient feels guilty about the events and questions how he will recover from the legal issues associated with all of this.  No active withdrawal symptoms noted or reported.      Spoke with patient's daughter Ms. Portia Peters who noted that her father has been having mood swing and issues with his neighbor. She reported her father has an aggressive history related to drinking, and currently these episodes are getting worse but always in the context of heavy drinking.

## 2022-03-02 NOTE — BEHAVIORAL HEALTH ASSESSMENT NOTE - NSBHCHARTREVIEWVS_PSY_A_CORE FT
ICU Vital Signs Last 24 Hrs  T(C): 36.1 (02 Mar 2022 08:01), Max: 36.7 (01 Mar 2022 20:30)  T(F): 97 (02 Mar 2022 08:01), Max: 98.1 (01 Mar 2022 20:30)  HR: 75 (02 Mar 2022 08:01) (69 - 75)  BP: 151/94 (02 Mar 2022 08:01) (138/67 - 160/91)  BP(mean): 111 (02 Mar 2022 03:13) (111 - 120)  ABP: --  ABP(mean): --  RR: 18 (02 Mar 2022 08:01) (18 - 20)  SpO2: 96% (01 Mar 2022 20:30) (96% - 99%)

## 2022-03-02 NOTE — PROGRESS NOTE ADULT - SUBJECTIVE AND OBJECTIVE BOX
JANETT BOWDEN  56y, Male  Allergy: No Known Allergies    Hospital Day: 3d    Patient seen and examined earlier today. no complaints today.  no anxious or agitated.     PMH/PSH:  PAST MEDICAL & SURGICAL HISTORY:  Hepatitis C    Cirrhosis    COPD (chronic obstructive pulmonary disease)    Kidney mass    Liver mass    Lung mass    GERD (gastroesophageal reflux disease)    No significant past surgical history        LAST 24-Hr EVENTS:    VITALS:  T(F): 97 (03-02-22 @ 08:01), Max: 98.1 (03-01-22 @ 20:30)  HR: 75 (03-02-22 @ 08:01)  BP: 151/94 (03-02-22 @ 08:01) (138/67 - 160/91)  RR: 18 (03-02-22 @ 08:01)  SpO2: 96% (03-01-22 @ 20:30)          TESTS & MEASUREMENTS:  Weight/BMI  63.5 (02-27-22 @ 01:49)  24 (02-27-22 @ 01:49)    02-28-22 @ 07:01  -  03-01-22 @ 07:00  --------------------------------------------------------  IN: 0 mL / OUT: 600 mL / NET: -600 mL    03-01-22 @ 07:01  -  03-02-22 @ 07:00  --------------------------------------------------------  IN: 950 mL / OUT: 2600 mL / NET: -1650 mL                            14.7   4.94  )-----------( 93       ( 02 Mar 2022 04:30 )             43.8     PT/INR - ( 02 Mar 2022 04:30 )   PT: 11.40 sec;   INR: 0.99 ratio         PTT - ( 02 Mar 2022 04:30 )  PTT:34.0 sec  INR: 0.99 ratio (03-02-22 @ 04:30)  INR: 1.16 ratio (02-27-22 @ 05:20)    03-02    139  |  103  |  12  ----------------------------<  102<H>  4.4   |  21  |  0.9    Ca    9.3      02 Mar 2022 04:30  Phos  3.5     03-02  Mg     1.8     03-02    TPro  7.0  /  Alb  4.5  /  TBili  1.0  /  DBili  x   /  AST  35  /  ALT  27  /  AlkPhos  62  03-02    LIVER FUNCTIONS - ( 02 Mar 2022 04:30 )  Alb: 4.5 g/dL / Pro: 7.0 g/dL / ALK PHOS: 62 U/L / ALT: 27 U/L / AST: 35 U/L / GGT: x                 Culture - Urine (collected 02-27-22 @ 05:43)  Source: Clean Catch Clean Catch (Midstream)  Final Report (02-28-22 @ 07:56):    <10,000 CFU/mL Normal Urogenital Adrienne    Culture - Blood (collected 02-27-22 @ 05:25)  Source: .Blood Blood-Peripheral  Preliminary Report (02-28-22 @ 12:01):    No growth to date.    Culture - Blood (collected 02-27-22 @ 05:25)  Source: .Blood Blood-Peripheral  Preliminary Report (02-28-22 @ 12:01):    No growth to date.          D-Dimer Assay, Quantitative: 166 ng/mL DDU (02-27-22 @ 02:44)      Serum Pro-Brain Natriuretic Peptide: 103 pg/mL (02-27-22 @ 02:44)    COVID-19 PCR: NotDetec (02-27-22 @ 04:53)                RADIOLOGY, ECG, & ADDITIONAL TESTS:  12 Lead ECG:   Ventricular Rate 122 BPM    Atrial Rate 122 BPM    P-R Interval 130 ms    QRS Duration 126 ms    Q-T Interval 352 ms    QTC Calculation(Bazett) 501 ms    P Axis 40 degrees    R Axis 114 degrees    T Axis -6 degrees    Diagnosis Line Sinus tachycardia  Right bundle branch block  Left posterior fascicular block  *** Bifascicular block ***  T wave abnormality, consider inferior ischemia  Abnormal ECG    Confirmed by Gilmer Nevarez ([id number]) on 2/27/2022 2:52:31 PM (02-27-22 @ 06:06)      RECENT DIAGNOSTIC ORDERS:      MEDICATIONS:  MEDICATIONS  (STANDING):  budesonide 160 MICROgram(s)/formoterol 4.5 MICROgram(s) Inhaler 2 Puff(s) Inhalation two times a day  dextrose 5% + lactated ringers. 1000 milliLiter(s) (75 mL/Hr) IV Continuous <Continuous>  enoxaparin Injectable 40 milliGRAM(s) SubCutaneous daily  folic acid 1 milliGRAM(s) Oral daily  gabapentin 100 milliGRAM(s) Oral three times a day  LORazepam     Tablet 1 milliGRAM(s) Oral every 4 hours  multivitamin 1 Tablet(s) Oral daily  pantoprazole  Injectable 40 milliGRAM(s) IV Push daily  senna 2 Tablet(s) Oral at bedtime  thiamine 100 milliGRAM(s) Oral daily  tiotropium 18 MICROgram(s) Capsule 1 Capsule(s) Inhalation daily    MEDICATIONS  (PRN):  ALBUTerol    90 MICROgram(s) HFA Inhaler 2 Puff(s) Inhalation every 6 hours PRN Bronchospasm  ondansetron Injectable 4 milliGRAM(s) IV Push every 8 hours PRN Nausea and/or Vomiting  polyethylene glycol 3350 17 Gram(s) Oral two times a day PRN Constipation      HOME MEDICATIONS:  albuterol 90 mcg/inh inhalation aerosol (02-27)  amitriptyline 10 mg oral tablet (02-27)  cycloSPORINE 0.09% ophthalmic solution (02-27)  gabapentin 100 mg oral capsule (02-27)  Spiriva 18 mcg inhalation capsule (02-27)  Symbicort 160 mcg-4.5 mcg/inh inhalation aerosol (02-27)      PHYSICAL EXAM:  GEN: Pt was seen and examined at bedside. No tremors, no sweating  HEENT: normocephalic, atraumatic, aniceteric  LUNGS: Clear to auscultation bilaterally, no rales/wheezing/ rhonchi  HEART: S1/S2 present. RRR, no murmurs  ABD: Soft, non-tender, non-distended. Bowel sounds present  EXT: No LE edema noted, no UE edema, pulse regular  NEURO: Alert and awake

## 2022-03-02 NOTE — DISCHARGE NOTE NURSING/CASE MANAGEMENT/SOCIAL WORK - NSDCPEFALRISK_GEN_ALL_CORE
For information on Fall & Injury Prevention, visit: [email or web address] OR  [email or web address] OR  https://www.cdc.gov/steadi/patient.html

## 2022-03-02 NOTE — DISCHARGE NOTE NURSING/CASE MANAGEMENT/SOCIAL WORK - PATIENT PORTAL LINK FT
You can access the FollowMyHealth Patient Portal offered by [facility] by registering at the following website: [email or web address]. By joining [employer]’s FollowMyHealth portal, you will also be able to view your health information using other applications (apps) compatible with our system.

## 2022-03-04 LAB
CULTURE RESULTS: SIGNIFICANT CHANGE UP
CULTURE RESULTS: SIGNIFICANT CHANGE UP
SPECIMEN SOURCE: SIGNIFICANT CHANGE UP
SPECIMEN SOURCE: SIGNIFICANT CHANGE UP

## 2022-03-04 NOTE — CDI QUERY NOTE - NSCDIOTHERTXTBX_GEN_ALL_CORE_HH
DOCUMENTATION CLARIFICATION FORM     Encounter #: [id number]                              Patient’s Name: Carl Thrasher  Medical Record #: [id number]                     Admit Date: 2-  CDI Specialist/: Dre                   Contact #: [phone]    Dear Dr. Rose,                Date: 3-4-2022                 The Physician’s or Provider’s documentation of the patient’s presentation, evaluation and  medical management, as identified below, may support a diagnosis that is not documented in the medical record.  In order to accurately capture all diagnoses to the greatest degree of specificity reflecting the patient’s actual severity of illness, the documentation in this patient’s medical record requires additional clarification.  Please include more specific documentation, either known or suspected, of a corresponding diagnosis associated with the clinical information described below in your Progress Note and/or Discharge Summary.    CLINICAL INDICATORS  Documentation  •	2-27 ED Note:.... history of alcohol abuse Liver cirrhosis and possibly cancer presents with vomiting for 1 day non-bloody nonbilious not associated with abdominal pain its moderate intensity.... complaining of nausea and trouble breathing... Principal Discharge DX: Lactic acidosis Secondary Diagnosis: Sepsis Secondary Diagnosis: Alcoholic ketoacidosis.  •	2-27 H&P:... N/V abdominal pain... lactic acidosis...no signs of infection   •	3-2 DC Summary:... Alcohol Withdrawal... N/V abdominal pain, likely 2/2 EtOH withdrawal resolving... CARMELITA, resolved...Lactic acidosis, resolving.... Diagnosis: Cirrhosis... Diagnosis: Alcohol abuse... Diagnosis: Sepsis    Labs  •	2-27  WBCs 16.78>8.54>6.74  •	2-27 VBG Lactate Level 8.70>8.50>7.40>2.8>2.1  •	2-27 Creatinine Level 1.5>1.2>1.1>0.9>0.7    Treatment  •	2-27 ED Vancomycin 100mg IVPB x 1 dose indicated as empiric  •	2-27 cefepime 200mg IVPB x 1 dose no indication  •	2-27 Lactated Ringers 1950ml IVF bolus  •	2-27 through 3-1 D5%+LR@100ml/hr   •	3-1 through 3-2 D5%+LR@175ml/hr    QUERY  Based on your professional judgment and the above clinical indicators, please clarify if sepsis can be further specified as:  •	Sepsis ruled out, noninfectious SIRS with CARMELITA associated with alcohol withdrawal   •	Sepsis was evaluated and ruled out  •	Sepsis, other (please specify): _________  •	Clinically unable to further specify sepsis         Documentation clarification is required for compliance, accuracy in coding and billing, and reporting severity of illness, quality data   and risk of mortality.  --------------------------------------------------------------------------------------------------------------------------------------------  DO NOT REMOVE THIS RECORD WITHOUT FIRST NOTIFYING THE CDI SPECIALIST  This form is NOT a part of the permanent Medical Record.

## 2022-03-07 DIAGNOSIS — F17.200 NICOTINE DEPENDENCE, UNSPECIFIED, UNCOMPLICATED: ICD-10-CM

## 2022-03-07 DIAGNOSIS — Z90.2 ACQUIRED ABSENCE OF LUNG [PART OF]: ICD-10-CM

## 2022-03-07 DIAGNOSIS — N20.0 CALCULUS OF KIDNEY: ICD-10-CM

## 2022-03-07 DIAGNOSIS — R11.2 NAUSEA WITH VOMITING, UNSPECIFIED: ICD-10-CM

## 2022-03-07 DIAGNOSIS — E53.8 DEFICIENCY OF OTHER SPECIFIED B GROUP VITAMINS: ICD-10-CM

## 2022-03-07 DIAGNOSIS — K21.9 GASTRO-ESOPHAGEAL REFLUX DISEASE WITHOUT ESOPHAGITIS: ICD-10-CM

## 2022-03-07 DIAGNOSIS — N17.9 ACUTE KIDNEY FAILURE, UNSPECIFIED: ICD-10-CM

## 2022-03-07 DIAGNOSIS — K57.90 DIVERTICULOSIS OF INTESTINE, PART UNSPECIFIED, WITHOUT PERFORATION OR ABSCESS WITHOUT BLEEDING: ICD-10-CM

## 2022-03-07 DIAGNOSIS — K76.0 FATTY (CHANGE OF) LIVER, NOT ELSEWHERE CLASSIFIED: ICD-10-CM

## 2022-03-07 DIAGNOSIS — E83.39 OTHER DISORDERS OF PHOSPHORUS METABOLISM: ICD-10-CM

## 2022-03-07 DIAGNOSIS — Z86.19 PERSONAL HISTORY OF OTHER INFECTIOUS AND PARASITIC DISEASES: ICD-10-CM

## 2022-03-07 DIAGNOSIS — J44.9 CHRONIC OBSTRUCTIVE PULMONARY DISEASE, UNSPECIFIED: ICD-10-CM

## 2022-03-07 DIAGNOSIS — Z85.118 PERSONAL HISTORY OF OTHER MALIGNANT NEOPLASM OF BRONCHUS AND LUNG: ICD-10-CM

## 2022-03-07 DIAGNOSIS — K44.9 DIAPHRAGMATIC HERNIA WITHOUT OBSTRUCTION OR GANGRENE: ICD-10-CM

## 2022-03-07 DIAGNOSIS — R65.11 SYSTEMIC INFLAMMATORY RESPONSE SYNDROME (SIRS) OF NON-INFECTIOUS ORIGIN WITH ACUTE ORGAN DYSFUNCTION: ICD-10-CM

## 2022-03-07 DIAGNOSIS — D69.6 THROMBOCYTOPENIA, UNSPECIFIED: ICD-10-CM

## 2022-03-07 DIAGNOSIS — E51.9 THIAMINE DEFICIENCY, UNSPECIFIED: ICD-10-CM

## 2022-03-07 DIAGNOSIS — F03.91 UNSPECIFIED DEMENTIA WITH BEHAVIORAL DISTURBANCE: ICD-10-CM

## 2022-03-07 DIAGNOSIS — F10.139 ALCOHOL ABUSE WITH WITHDRAWAL, UNSPECIFIED: ICD-10-CM

## 2022-03-07 DIAGNOSIS — E87.2 ACIDOSIS: ICD-10-CM
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
## 2022-03-07 DIAGNOSIS — K74.60 UNSPECIFIED CIRRHOSIS OF LIVER: ICD-10-CM
